# Patient Record
Sex: MALE | Race: WHITE | Employment: UNEMPLOYED | URBAN - NONMETROPOLITAN AREA
[De-identification: names, ages, dates, MRNs, and addresses within clinical notes are randomized per-mention and may not be internally consistent; named-entity substitution may affect disease eponyms.]

---

## 2022-09-10 ENCOUNTER — APPOINTMENT (OUTPATIENT)
Dept: GENERAL RADIOLOGY | Age: 22
End: 2022-09-10
Payer: COMMERCIAL

## 2022-09-10 ENCOUNTER — HOSPITAL ENCOUNTER (EMERGENCY)
Age: 22
Discharge: HOME | End: 2022-09-10
Payer: COMMERCIAL

## 2022-09-10 VITALS
OXYGEN SATURATION: 96 % | DIASTOLIC BLOOD PRESSURE: 61 MMHG | TEMPERATURE: 98.1 F | WEIGHT: 170 LBS | SYSTOLIC BLOOD PRESSURE: 146 MMHG | RESPIRATION RATE: 19 BRPM | BODY MASS INDEX: 25.18 KG/M2 | HEIGHT: 69 IN | HEART RATE: 97 BPM

## 2022-09-10 DIAGNOSIS — R07.89 ATYPICAL CHEST PAIN: ICD-10-CM

## 2022-09-10 DIAGNOSIS — R20.2 PARESTHESIA: Primary | ICD-10-CM

## 2022-09-10 LAB
ALBUMIN SERPL-MCNC: 4.6 G/DL (ref 3.5–5.1)
ALP BLD-CCNC: 60 U/L (ref 38–126)
ALT SERPL-CCNC: < 5 U/L (ref 11–66)
ANION GAP SERPL CALCULATED.3IONS-SCNC: 10 MEQ/L (ref 8–16)
AST SERPL-CCNC: < 5 U/L (ref 5–40)
BASOPHILS # BLD: 0.7 %
BASOPHILS ABSOLUTE: 0.1 THOU/MM3 (ref 0–0.1)
BILIRUB SERPL-MCNC: 0.2 MG/DL (ref 0.3–1.2)
BILIRUBIN DIRECT: < 0.2 MG/DL (ref 0–0.3)
BUN BLDV-MCNC: 16 MG/DL (ref 7–22)
CALCIUM SERPL-MCNC: 9.4 MG/DL (ref 8.5–10.5)
CHLORIDE BLD-SCNC: 103 MEQ/L (ref 98–111)
CO2: 26 MEQ/L (ref 23–33)
CREAT SERPL-MCNC: 1.1 MG/DL (ref 0.4–1.2)
EKG ATRIAL RATE: 108 BPM
EKG P AXIS: 69 DEGREES
EKG P-R INTERVAL: 162 MS
EKG Q-T INTERVAL: 316 MS
EKG QRS DURATION: 82 MS
EKG QTC CALCULATION (BAZETT): 423 MS
EKG R AXIS: 96 DEGREES
EKG T AXIS: 39 DEGREES
EKG VENTRICULAR RATE: 108 BPM
EOSINOPHIL # BLD: 1.6 %
EOSINOPHILS ABSOLUTE: 0.1 THOU/MM3 (ref 0–0.4)
ERYTHROCYTE [DISTWIDTH] IN BLOOD BY AUTOMATED COUNT: 12.4 % (ref 11.5–14.5)
ERYTHROCYTE [DISTWIDTH] IN BLOOD BY AUTOMATED COUNT: 39.2 FL (ref 35–45)
GFR SERPL CREATININE-BSD FRML MDRD: 83 ML/MIN/1.73M2
GLUCOSE BLD-MCNC: 155 MG/DL (ref 70–108)
HCT VFR BLD CALC: 44 % (ref 42–52)
HEMOGLOBIN: 15.5 GM/DL (ref 14–18)
IMMATURE GRANS (ABS): 0.02 THOU/MM3 (ref 0–0.07)
IMMATURE GRANULOCYTES: 0.2 %
LYMPHOCYTES # BLD: 47.6 %
LYMPHOCYTES ABSOLUTE: 4.1 THOU/MM3 (ref 1–4.8)
MCH RBC QN AUTO: 30.6 PG (ref 26–33)
MCHC RBC AUTO-ENTMCNC: 35.2 GM/DL (ref 32.2–35.5)
MCV RBC AUTO: 87 FL (ref 80–94)
MONOCYTES # BLD: 6.2 %
MONOCYTES ABSOLUTE: 0.5 THOU/MM3 (ref 0.4–1.3)
NUCLEATED RED BLOOD CELLS: 0 /100 WBC
OSMOLALITY CALCULATION: 281.9 MOSMOL/KG (ref 275–300)
PLATELET # BLD: 217 THOU/MM3 (ref 130–400)
PMV BLD AUTO: 11.5 FL (ref 9.4–12.4)
POTASSIUM SERPL-SCNC: 4.4 MEQ/L (ref 3.5–5.2)
RBC # BLD: 5.06 MILL/MM3 (ref 4.7–6.1)
SEG NEUTROPHILS: 43.7 %
SEGMENTED NEUTROPHILS ABSOLUTE COUNT: 3.8 THOU/MM3 (ref 1.8–7.7)
SODIUM BLD-SCNC: 139 MEQ/L (ref 135–145)
TOTAL PROTEIN: 6.6 G/DL (ref 6.1–8)
TROPONIN T: < 0.01 NG/ML
WBC # BLD: 8.7 THOU/MM3 (ref 4.8–10.8)

## 2022-09-10 PROCEDURE — 71046 X-RAY EXAM CHEST 2 VIEWS: CPT

## 2022-09-10 PROCEDURE — 93010 ELECTROCARDIOGRAM REPORT: CPT | Performed by: INTERNAL MEDICINE

## 2022-09-10 PROCEDURE — 85025 COMPLETE CBC W/AUTO DIFF WBC: CPT

## 2022-09-10 PROCEDURE — 80053 COMPREHEN METABOLIC PANEL: CPT

## 2022-09-10 PROCEDURE — 99285 EMERGENCY DEPT VISIT HI MDM: CPT

## 2022-09-10 PROCEDURE — 84484 ASSAY OF TROPONIN QUANT: CPT

## 2022-09-10 PROCEDURE — 82248 BILIRUBIN DIRECT: CPT

## 2022-09-10 PROCEDURE — 93005 ELECTROCARDIOGRAM TRACING: CPT | Performed by: PHYSICIAN ASSISTANT

## 2022-09-10 ASSESSMENT — ENCOUNTER SYMPTOMS
VOMITING: 0
SHORTNESS OF BREATH: 0
DIARRHEA: 0
SORE THROAT: 0
ABDOMINAL PAIN: 0
COUGH: 0
RHINORRHEA: 0
PHOTOPHOBIA: 0
BACK PAIN: 0
NAUSEA: 1
TROUBLE SWALLOWING: 0

## 2022-09-10 ASSESSMENT — PAIN - FUNCTIONAL ASSESSMENT: PAIN_FUNCTIONAL_ASSESSMENT: NONE - DENIES PAIN

## 2022-09-10 NOTE — ED PROVIDER NOTES
[facility] EMERGENCY DEPT      CHIEF COMPLAINT       Chief Complaint   Patient presents with    Chest Pain    Numbness       Nurses Notes reviewed and I agree except as noted in the HPI. HISTORY OF PRESENT ILLNESS    Yulissa Ramirez is a 24 y.o. male who presents for evaluation. Patient reports for the past 1 week he has been experiencing intermittent \"pinching pain\" in his arms and legs. Now for the past 2 to 3 days he has been experiencing momentary pain in his central chest.  The symptoms have no modifying factors. Tonight the left side of the patient's face went numb for 1 second prompting him to come to the ER for evaluation. He feels the symptoms are odd and he wanted to make sure that nothing bad was going on. Additionally patient had googled his symptoms which made him nervous for blood clots and stroke. Patient experienced nausea around noon but attributes that to eating fast food lately. Patient endorses normal vision but denies dyspnea, diaphoresis, facial weakness, extremity weakness, URI symptoms, dizziness, or other complaints. There have been no new medications. Patient has no past medical history. Patient does not smoke, drink alcohol, or use drugs. Family history is positive for paternal NIDDM. REVIEW OF SYSTEMS     Review of Systems   Constitutional:  Negative for chills, diaphoresis and fever. HENT:  Negative for congestion, rhinorrhea, sore throat and trouble swallowing. Eyes:  Negative for photophobia and visual disturbance. Respiratory:  Negative for cough and shortness of breath. Cardiovascular:  Positive for chest pain. Gastrointestinal:  Positive for nausea. Negative for abdominal pain, diarrhea and vomiting. Genitourinary:  Negative for difficulty urinating. Musculoskeletal:  Positive for myalgias. Negative for back pain, gait problem and neck pain. Skin:  Negative for rash. Neurological:  Positive for numbness.  Negative for dizziness, facial asymmetry, speech difficulty, weakness, light-headedness and headaches. Psychiatric/Behavioral:  Negative for confusion. The patient is nervous/anxious. PAST MEDICAL HISTORY    has no past medical history on file. SURGICAL HISTORY      has no past surgical history on file. CURRENT MEDICATIONS       Previous Medications    No medications on file       ALLERGIES     has No Known Allergies. FAMILY HISTORY     He indicated that his mother is alive. He indicated that his father is alive. family history is not on file. SOCIAL HISTORY    reports that he does not currently use alcohol. He reports that he does not use drugs. PHYSICAL EXAM     INITIAL VITALS:  height is 5' 9\" (1.753 m) and weight is 170 lb (77.1 kg). His oral temperature is 98.1 °F (36.7 °C). His blood pressure is 146/61 (abnormal) and his pulse is 97. His respiration is 19 and oxygen saturation is 96%. Physical Exam  Constitutional:       Appearance: Normal appearance. He is well-developed. He is not ill-appearing or diaphoretic. HENT:      Head: Normocephalic and atraumatic. Right Ear: Hearing normal.      Left Ear: Hearing normal.      Nose: Nose normal. No rhinorrhea. Mouth/Throat:      Lips: Pink. Mouth: Mucous membranes are moist.      Pharynx: Oropharynx is clear. Eyes:      General: Lids are normal. No scleral icterus. Extraocular Movements: Extraocular movements intact. Conjunctiva/sclera: Conjunctivae normal.      Pupils: Pupils are equal, round, and reactive to light. Neck:      Trachea: Trachea normal.   Cardiovascular:      Rate and Rhythm: Normal rate and regular rhythm. Heart sounds: Normal heart sounds. No murmur heard. Pulmonary:      Effort: Pulmonary effort is normal.      Breath sounds: Normal breath sounds and air entry. No decreased breath sounds, wheezing or rhonchi. Abdominal:      General: There is no distension. Palpations: Abdomen is soft. Tenderness:  There is no abdominal tenderness. Musculoskeletal:      Cervical back: Normal range of motion and neck supple. No rigidity. Comments: Well perfused; movement normal as observed; no signs of DVT   Lymphadenopathy:      Cervical: No cervical adenopathy. Skin:     General: Skin is warm and dry. Findings: No rash. Neurological:      General: No focal deficit present. Mental Status: He is alert. Cranial Nerves: Cranial nerves are intact. Sensory: Sensation is intact. Motor: Motor function is intact. Gait: Gait is intact. Psychiatric:         Mood and Affect: Mood is anxious. Speech: Speech normal.         Behavior: Behavior is cooperative. DIFFERENTIAL DIAGNOSIS:   Including but not limited to: Paresthesia, anxiety, atypical chest pain, myalgias, considered but no evidence for DVT, ACS, TIA, CVA, PE    DIAGNOSTIC RESULTS     EKG: All EKG's are [facility] who either signs or Co-signs this chart in the absence of a cardiologist.  Ventricular rate 108 bpm  MN interval 162 ms  QRS duration 82 ms  QTc interval 423 ms  P-R-T axes 69, 96, 39  Sinus tachycardia with PVCs. No STEMI    RADIOLOGY: non-plain film images(s) such as CT,Ultrasound and MRI are read by the radiologist.  Plain radiographic images are visualized and preliminarily interpreted by the emergency physician unless otherwise stated below. XR CHEST (2 VW)   Final Result   Impression:   No acute findings. This document has been electronically signed by: Garett Miller. Nina Garza MD    on 09/10/2022 04:52 AM          LABS:   Labs Reviewed   BASIC METABOLIC PANEL - Abnormal; Notable for the following components:       Result Value    Glucose 155 (*)     All other components within normal limits   HEPATIC FUNCTION PANEL - Abnormal; Notable for the following components:     Total Bilirubin 0.2 (*)     ALT <5 (*)     All other components within normal limits   GLOMERULAR FILTRATION RATE, ESTIMATED - Abnormal; Notable for the following components:    Est, Glom Filt Rate 83 (*)     All other components within normal limits   CBC WITH AUTO DIFFERENTIAL   TROPONIN   ANION GAP   OSMOLALITY       EMERGENCY DEPARTMENT COURSE:   Vitals:    Vitals:    09/10/22 0346 09/10/22 0456 09/10/22 0537   BP: (!) 158/92 (!) 146/61    Pulse: (!) 110 100 97   Resp: 19     Temp: 98.1 °F (36.7 °C)     TempSrc: Oral     SpO2: 98% 96%    Weight: 170 lb (77.1 kg)     Height: 5' 9\" (1.753 m)             MDM:  The patient was seen and evaluated within the ED today for facial paresthesia. On exam, I appreciated no acute findings. The patient was anxious but neurologically intact with no signs of CVA. Old records were reviewed. Within the department, I observed the patient's vital signs to be within acceptable range although mildly tachycardic and hypertensive. Radiological studies within the department revealed no acute intra cardiopulmonary process. Laboratory work was reassuring. Within the department the patient declined pain medication. I observed the patient's condition to modestly improve during the duration of their stay. On re-exam patient had no new complaints. Vital signs remained stable. The patient remained non-toxic appearing with no distress evident in the ER. No sinister etiology was found for patient's symptoms and he was deemed appropriate for discharge. The patient was comfortable with the plan of discharge home and to follow up with [facility]. Anticipatory guidance was given. The patient was instructed to return to the emergency department for any worsening of their symptoms. Patient was discharged from the emergency department in good condition with all questions answered. See disposition below.  I have given the patient strict written and verbal instructions about care at home, follow-up, and signs and symptoms of worsening of condition and they did verbalize understanding. CRITICAL CARE:   None    CONSULTS:  None    PROCEDURES:  None    FINAL IMPRESSION      1. Paresthesia    2. Atypical chest pain          DISPOSITION/PLAN     1. Paresthesia    2. Atypical chest pain        PATIENT REFERRED TO:  [address] [facility] [address] [place]. [address] [facility] [address]  Schedule an appointment as soon as possible for a visit   Please arrive 15 minutes early for paperwork.     DISCHARGE MEDICATIONS:  New Prescriptions    No medications on file       (Please note that portions of this note were completed with a voice recognition program.  Efforts were made to edit the dictations but occasionally words are mis-transcribed.)    Ata Hadley PA-C 09/10/22 5:44 AM    JEANETTE Villela PA-C  09/10/22 [id number]

## 2022-09-16 ENCOUNTER — HOSPITAL ENCOUNTER (EMERGENCY)
Age: 22
Discharge: HOME | End: 2022-09-16
Attending: EMERGENCY MEDICINE
Payer: COMMERCIAL

## 2022-09-16 VITALS
OXYGEN SATURATION: 96 % | WEIGHT: 175 LBS | SYSTOLIC BLOOD PRESSURE: 146 MMHG | DIASTOLIC BLOOD PRESSURE: 82 MMHG | BODY MASS INDEX: 25.92 KG/M2 | TEMPERATURE: 98.1 F | HEART RATE: 101 BPM | HEIGHT: 69 IN | RESPIRATION RATE: 14 BRPM

## 2022-09-16 DIAGNOSIS — R20.2 PARESTHESIA: Primary | ICD-10-CM

## 2022-09-16 PROCEDURE — 99282 EMERGENCY DEPT VISIT SF MDM: CPT

## 2022-09-16 ASSESSMENT — PAIN - FUNCTIONAL ASSESSMENT
PAIN_FUNCTIONAL_ASSESSMENT: NONE - DENIES PAIN
PAIN_FUNCTIONAL_ASSESSMENT: NONE - DENIES PAIN

## 2022-09-16 NOTE — DISCHARGE INSTRUCTIONS
See neurology referral information. Return to emergency department for any new pain, shortness of breath or any new symptoms or concerns.

## 2022-09-16 NOTE — ED PROVIDER NOTES
[address] [facility] [address] [address] EMERGENCY DEPT  [address] [facility] [address]  Phone: [phone] [address] [facility]       Chief Complaint   Patient presents with    Numbness     Left leg       Nurses Notes reviewed and I agree except as noted in the HPI. HISTORY OF PRESENT ILLNESS    Ashley Lee is a 24 y.o. male. The patient is describing intermittent paresthesias. They are not localized to 1 side or the other. Sometimes it is the right side of the face and neck and sometimes into the left. Sometimes it is in his leg. At the time of arrival he is not having any of the paresthesias. It seems to wax and wane and come and go. He has not had any trouble with ambulation. No trouble with swallowing or speaking. No trouble with vision. No recent falls or injuries. The patient was seen in the ER for similar symptoms within the last week or so had a negative work-up at the time. REVIEW OF SYSTEMS         No chest pain no dyspnea no muscle weakness in the arms or legs. Remainder of review of systems is otherwise reviewed as negative. PAST MEDICAL HISTORY    has no past medical history on file. SURGICAL HISTORY      has no past surgical history on file. CURRENT MEDICATIONS     There are no discharge medications for this patient. ALLERGIES     has No Known Allergies. FAMILY HISTORY     He indicated that his mother is alive. He indicated that his father is alive. family history is not on file. SOCIAL HISTORY      reports that he has never smoked. He has never used smokeless tobacco. He reports that he does not currently use alcohol. He reports that he does not use drugs. PHYSICAL EXAM     INITIAL VITALS:  height is 5' 9\" (1.753 m) and weight is 175 lb (79.4 kg). His oral temperature is 98.1 °F (36.7 °C). His blood pressure is 146/82 (abnormal) and his pulse is 101 (abnormal). His respiration is 14 and oxygen saturation is 96%.       Constitutional: Well appearing and non-toxic Eyes:  Pupils are equal and reactive, extraocular muscles intact   HENT:  Atraumatic appearing  oropharynx moist, no pharyngeal exudates. Neck- normal range of motion, no tenderness, supple   Respiratory:  No wheezing, rhonchi or rales  Cardiovascular: regular  GI:  Non tender, no rigidity, rebound or guarding  Musculoskeletal:  2/4 distal pulses, no pitting edema  Integument: warm and dry  Neurologic:  Alert & oriented x 3, cranial nerves II through XII are grossly intact. Equal strength in the upper and lower extremities bilaterally. Equal reflexes in the lower extremities bilaterally  Psychiatric:  Speech and behavior appropriate        DIAGNOSTIC RESULTS               LABS:   Labs Reviewed - No data to display    EMERGENCY DEPARTMENT COURSE:   Vitals:    Vitals:    09/16/22 0218 09/16/22 0328   BP: (!) 148/89 (!) 146/82   Pulse: (!) 121 (!) 101   Resp: 16 14   Temp: 98.1 °F (36.7 °C)    TempSrc: Oral    SpO2: 97% 96%   Weight: 175 lb (79.4 kg)    Height: 5' 9\" (1.753 m)      This patient has a normal neurological exam at the time of my exam.  He was initially noted to be tachycardic but it is come down. It is felt that there might be a component of anxiety in this. The paresthesias come and go in various parts of the body it is on the alternating between 1 side and the other. Its not a focal neurologic distribution that would correspond with a stroke or TIA. I do not think CT imaging would be of benefit. He had blood work on the previous visit which essentially came back negative and I do not think could be a benefit to repeating it today. Working to refer him to neurology on an outpatient basis. CRITICAL CARE:   none         FINAL IMPRESSION      Paresthesias    DISPOSITION/PLAN   discharged    DISCHARGE MEDICATIONS:  There are no discharge medications for this patient.       (Please note that portions of this note were completed with a voice recognition program.  Efforts were made to edit the

## 2022-09-16 NOTE — ED TRIAGE NOTES
Pt comes to ED through triage with c/c leg numbness. Pt states that around 2130 he noticed slight numbness in his neck. At approximately 2330 pt reports feeling numbness in his left lower leg. Pt reports that he was recently seen here for facial numbness. Pt denies the presence of pain. Pt denies any current numbness but states that it \"comes and goes. \" Bilateral pedal pulses noted equal in strength.

## 2022-09-26 ENCOUNTER — TELEPHONE (OUTPATIENT)
Dept: CARDIOLOGY CLINIC | Age: 22
End: 2022-09-26

## 2022-09-26 ENCOUNTER — INITIAL CONSULT (OUTPATIENT)
Dept: NEUROLOGY | Age: 22
End: 2022-09-26
Payer: COMMERCIAL

## 2022-09-26 ENCOUNTER — HOSPITAL ENCOUNTER (OUTPATIENT)
Age: 22
Discharge: HOME | End: 2022-09-26
Payer: COMMERCIAL

## 2022-09-26 VITALS
BODY MASS INDEX: 25.48 KG/M2 | HEIGHT: 69 IN | DIASTOLIC BLOOD PRESSURE: 98 MMHG | OXYGEN SATURATION: 99 % | HEART RATE: 102 BPM | SYSTOLIC BLOOD PRESSURE: 132 MMHG | WEIGHT: 172 LBS

## 2022-09-26 DIAGNOSIS — I49.9 IRREGULAR HEART RATE: ICD-10-CM

## 2022-09-26 DIAGNOSIS — R20.0 NUMBNESS OF FACE: ICD-10-CM

## 2022-09-26 DIAGNOSIS — R20.0 NUMBNESS: ICD-10-CM

## 2022-09-26 DIAGNOSIS — R20.0 NUMBNESS OF FACE: Primary | ICD-10-CM

## 2022-09-26 LAB
C-REACTIVE PROTEIN: < 0.3 MG/DL (ref 0–1)
EKG ATRIAL RATE: 112 BPM
EKG P AXIS: 70 DEGREES
EKG P-R INTERVAL: 176 MS
EKG Q-T INTERVAL: 316 MS
EKG QRS DURATION: 76 MS
EKG QTC CALCULATION (BAZETT): 431 MS
EKG R AXIS: 98 DEGREES
EKG T AXIS: 26 DEGREES
EKG VENTRICULAR RATE: 112 BPM
FOLATE: 16.9 NG/ML (ref 4.8–24.2)
RHEUMATOID FACTOR: < 10 IU/ML (ref 0–13)
SEDIMENTATION RATE, ERYTHROCYTE: 1 MM/HR (ref 0–10)
TSH SERPL DL<=0.05 MIU/L-ACNC: 2.37 UIU/ML (ref 0.4–4.2)
VITAMIN B-12: 1039 PG/ML (ref 211–911)

## 2022-09-26 PROCEDURE — 86430 RHEUMATOID FACTOR TEST QUAL: CPT

## 2022-09-26 PROCEDURE — 82525 ASSAY OF COPPER: CPT

## 2022-09-26 PROCEDURE — 93010 ELECTROCARDIOGRAM REPORT: CPT | Performed by: NUCLEAR MEDICINE

## 2022-09-26 PROCEDURE — 82607 VITAMIN B-12: CPT

## 2022-09-26 PROCEDURE — 99205 OFFICE O/P NEW HI 60 MIN: CPT | Performed by: PSYCHIATRY & NEUROLOGY

## 2022-09-26 PROCEDURE — 84443 ASSAY THYROID STIM HORMONE: CPT

## 2022-09-26 PROCEDURE — 86235 NUCLEAR ANTIGEN ANTIBODY: CPT

## 2022-09-26 PROCEDURE — 84207 ASSAY OF VITAMIN B-6: CPT

## 2022-09-26 PROCEDURE — 93005 ELECTROCARDIOGRAM TRACING: CPT | Performed by: NURSE PRACTITIONER

## 2022-09-26 PROCEDURE — G8419 CALC BMI OUT NRM PARAM NOF/U: HCPCS | Performed by: PSYCHIATRY & NEUROLOGY

## 2022-09-26 PROCEDURE — 85651 RBC SED RATE NONAUTOMATED: CPT

## 2022-09-26 PROCEDURE — 86038 ANTINUCLEAR ANTIBODIES: CPT

## 2022-09-26 PROCEDURE — G8427 DOCREV CUR MEDS BY ELIG CLIN: HCPCS | Performed by: PSYCHIATRY & NEUROLOGY

## 2022-09-26 PROCEDURE — 1036F TOBACCO NON-USER: CPT | Performed by: PSYCHIATRY & NEUROLOGY

## 2022-09-26 PROCEDURE — 82746 ASSAY OF FOLIC ACID SERUM: CPT

## 2022-09-26 PROCEDURE — 86140 C-REACTIVE PROTEIN: CPT

## 2022-09-26 PROCEDURE — 86225 DNA ANTIBODY NATIVE: CPT

## 2022-09-26 NOTE — PATIENT INSTRUCTIONS
MRI brain W/WO contrast  Will order connective tissue screen including BERNARDO, rheumatoid factor, antidouble-stranded DNA, anti-SSA, anti-SSB  and sed rate. Vitamin B12, folate  Vitamin B6  Copper level  TSH with reflex T4  Referral to cardiology re: irregular heart rhythm   Cardiac echo   EKG  48 hour holter  Hold off on exercise. Call with any new symptoms or concerns. Follow up in 1 month.

## 2022-09-26 NOTE — LETTER
[address] [facility]  [facility] [address] [facility] [address]  Phone: [phone]  Fax: [phone]           Tamar Conti MD      September 28, 2022     Patient: Doris Santana   MR Number: [id number]   YOB: 2000   Date of Visit: 9/26/2022       Dear Dr Nikole Louise    Thank you for agreeing to see Jenny Fonseca  evaluation/treatment. Below are the relevant portions of my assessment and plan of care. If you have questions, please do not hesitate to call me. I look forward to following Lane Chu along with you.     Sincerely,        Tamar Conti MD    CC providers:  Femi Gomes MD  [address] [place] [address]  [facility]

## 2022-09-26 NOTE — TELEPHONE ENCOUNTER
LM for patient to call office back.     Received new patient referral in Epic for   - Numbness of face   R20.0 (ICD-10-CM) - Numbness   I49.9 (ICD-10-CM) - Irregular heart rate

## 2022-09-28 NOTE — PROGRESS NOTES
Chief Complaint   Patient presents with    Consultation     numbness         Caroline Clancy is a 24 y.o. male who presents today for evaluation of 2 episodes of left face numbness that occurred 2 weeks ago. He also reports occasional patchy numbness in his hands and legs. No change in vision. No incontinence of bowel or bladder. No tick bites. No cold or flu like illness prior to onset of symptoms. No family history of connective tissue diseases. No head imaging done. He denies chest pain. No shortness of breath, no neck pain. No vision changes. No dysphagia. No fever. No rash. No weight loss. History provided by patient. History reviewed. No pertinent past medical history. There is no problem list on file for this patient. No Known Allergies    No current outpatient medications on file. No current facility-administered medications for this visit. Social History     Socioeconomic History    Marital status: Single     Spouse name: None    Number of children: None    Years of education: None    Highest education level: None   Tobacco Use    Smoking status: Never    Smokeless tobacco: Never   Vaping Use    Vaping Use: Never used   Substance and Sexual Activity    Alcohol use: Not Currently    Drug use: Never    Sexual activity: Not Currently       Family History   Problem Relation Age of Onset    No Known Problems Mother     Diabetes Father     No Known Problems Sister     No Known Problems Brother     No Known Problems Brother     No Known Problems Brother          I reviewed the past medical history, allergies, medications, social history and family history.    Review of Systems   All systems reviewed, and are all negative, except what is mentioned in HPI      Vitals:    09/26/22 1500   BP: (!) 132/98   Site: Right Upper Arm   Position: Sitting   Cuff Size: Medium Adult   Pulse: (!) 102   SpO2: 99%   Weight: 172 lb (78 kg)   Height: 5' 9\" (1.753 m)       Physical Examination:  General appearance - alert, well appearing, and in no distress, oriented to person, place, and time and normal weight  Mental status- Level of Alertness: awake  Orientation: person, place, time  Memory: normal  Fund of Knowledge: normal  Attention/Concentration: normal  Language: normal. Mood is anxious  Neck - supple, no significant adenopathy, carotids upstroke normal bilaterally. There is no axillary lymphadenopathy. There is no carotid bruit. No neck lymphadenopathy. No thyroid enlargement   Neurological -   Cranial [id number]:.   Cranial nerve II: Normal   Cranial nerve III: Pupils: equal, round, reactive to light  Cranial nerves III, IV, VI: Extraocular Movements: intact   Cranial nerve V: Facial sensation: intact   Cranial nerve VII:Facial strength: intact   Cranial nerve VIII: Hearing: intact   Cranial nerve IX: Palate Elevation intact bilaterally  Cranial nerve XI: Shoulder shrug intact bilaterally  Cranial nerve XII: Tongue midline   neck supple without rigidity, there is no limitation of range of motion of the neck. DTR's are Intact distal and symmetric  Babinski sign negative  Motor exam is 5/5 in the upper and lower extremities. Normal muscle tone. There is no muscle atrophy. Sensory is intact for light touch. Coordination: finger to nose, intact  Gait and station intact  Abnormal movement none, vibration normal, proprioception normal  Skin - warm, dry to touch, normal coloration, no rashes, no suspicious skin lesions  Superficial temporal artery pulses are normal.   There is no limitation of range of motion of the neck. There is no resting tremor, no pin rolling, no bradykinesia, no Hypohonia, normal blink rate. Musculoskeletal: Has no hand arthritis, no limitation of ROM in any of the four extremities. There is pectus excavatum noted. There is no leg edema. The Heart was irregular in rate and rhythm. +ve mitral heart murmur  Chest Clear, with  good effort. There is pectus excavatum noted. Abdomen soft, intact bowel sounds. We reviewed the patient records from referring provider and available information in the EHR       ASSESSMENT:      Diagnosis Orders   1. Numbness of face  MRI BRAIN W WO CONTRAST    BERNARDO Screen with Reflex    Anti SSA    Anti SSB    Anti-DNA Antibody, Double-Stranded    C-Reactive Protein    Rheumatoid Factor    Sedimentation Rate    Vitamin B12 & Folate    Vitamin B6    Copper, Serum    ECHO 2D WO COLOR DOPPLER COMPLETE    Holter Monitor 48 Hour    EKG 12 lead    Vineet Claire MD, Cardiology, [address] [place] [address]      2. Numbness  MRI BRAIN W WO CONTRAST    BERNARDO Screen with Reflex    Anti SSA    Anti SSB    Anti-DNA Antibody, Double-Stranded    C-Reactive Protein    Rheumatoid Factor    Sedimentation Rate    Vitamin B12 & Folate    Vitamin B6    Copper, Serum    ECHO 2D WO COLOR DOPPLER COMPLETE    Holter Monitor 48 Hour    EKG 12 lead    Martha Rodriguez MD, Cardiology, [address] [place] [address]      3. Irregular heart rate  BERNARDO Screen with Reflex    Anti SSA    Anti SSB    Anti-DNA Antibody, Double-Stranded    C-Reactive Protein    Rheumatoid Factor    Vitamin B12 & Folate    Vitamin B6    Copper, Serum    ECHO 2D WO COLOR DOPPLER COMPLETE    Holter Monitor 48 Hour    EKG 12 lead    Vineet Claire MD, Cardiology, [address] [place] [address]         This is a 70-year-old male who presents with 2 episodes of left face numbness that occurred 2 weeks ago. He also reports occasional patchy numbness in his hands and legs. On exam, there is no muscle weakness, no sensory level noted, no muscle atrophy or fasciculation, deep tendon reflexes are intact +2. However there is an irregular heart rate noted , the patient has pectus excavatum. He states he has not seen a doctor in a long time. The patient was counseled about his symptoms and work up recommended, from neurologic stand point and he will need referral to cardiology for work up.  We will send him for cardiac echo as he has mitral valve murmur, and arrange for an EKG, and 48 holter monitor, to expedite his cardiac disposition/work up, to be read by the cardiologist we are referring the patient to. We will also arrange for him to undergo an MRI of the brain with and without contrast to screen for organic causes of his symptoms as well as lab work checking connective tissue screening, thyroid function studies, trace mineral elements and vitamin levels. He will undergo formal evaluation with cardiology regarding his irregular heart rhythm as well as obtain cardiac echo and EKG. Asked him to hold off on exercising, workouts until work-up is completed and he is cleared. After detailed discussion with the patient we agreed on the following plan. Plan    MRI brain W/WO contrast  Will order connective tissue screen including BERNARDO, rheumatoid factor, antidouble-stranded DNA, anti-SSA, anti-SSB  and sed rate. Vitamin B12, folate  Vitamin B6  Copper level  TSH with reflex T4  Referral to cardiology re: irregular heart rhythm   Cardiac echo   EKG  48 hour holter  Hold off on exercise. Call with any new symptoms or concerns. Follow up in 1 month.      Total time 72 min      Boy Melgar MD

## 2022-09-29 ENCOUNTER — OFFICE VISIT (OUTPATIENT)
Dept: CARDIOLOGY CLINIC | Age: 22
End: 2022-09-29
Payer: COMMERCIAL

## 2022-09-29 VITALS
SYSTOLIC BLOOD PRESSURE: 158 MMHG | WEIGHT: 169 LBS | BODY MASS INDEX: 25.03 KG/M2 | HEIGHT: 69 IN | DIASTOLIC BLOOD PRESSURE: 90 MMHG | HEART RATE: 100 BPM

## 2022-09-29 DIAGNOSIS — I49.3 PVC (PREMATURE VENTRICULAR CONTRACTION): Primary | ICD-10-CM

## 2022-09-29 LAB
ANA SCREEN: NORMAL
COPPER: 86.1 UG/DL (ref 70–140)
DSDNA ANTIBODY: NORMAL

## 2022-09-29 PROCEDURE — G8420 CALC BMI NORM PARAMETERS: HCPCS | Performed by: INTERNAL MEDICINE

## 2022-09-29 PROCEDURE — 1036F TOBACCO NON-USER: CPT | Performed by: INTERNAL MEDICINE

## 2022-09-29 PROCEDURE — 99204 OFFICE O/P NEW MOD 45 MIN: CPT | Performed by: INTERNAL MEDICINE

## 2022-09-29 PROCEDURE — G8427 DOCREV CUR MEDS BY ELIG CLIN: HCPCS | Performed by: INTERNAL MEDICINE

## 2022-09-29 NOTE — PROGRESS NOTES
New patient here for check up     Pt has went to ED twice in the last 3 weeks , chest pain, arm numbness, having areas with numbness,

## 2022-09-29 NOTE — PROGRESS NOTES
[facility] [address] [place] [address]  Dept: [phone]  Dept Fax: [phone]  Loc: [phone]    Visit Date: 9/29/2022    Mr. Elena Elliott is a 24 y.o. male  who presented for:    HPI:   25 yo M is referred here from irregular heart beats by neurology. Patient was in the ER for what seems to be paresthesia. He is very nervous about possibility of CVA. Concerned about high BP. No current outpatient medications on file. Past Medical History  Reyes Brown  has no past medical history on file. Social History  Reyes Brown  reports that he has never smoked. He has never used smokeless tobacco. He reports that he does not currently use alcohol. He reports that he does not use drugs. Family History  Reyes Brown family history includes Diabetes in his father; No Known Problems in his brother, brother, brother, mother, and sister. Past Surgical History   No past surgical history on file. Subjective:     REVIEW OF SYSTEMS  Constitutional: denies sweats, chills and fever  HENT: denies  congestion, sinus pressure, sneezing and sore throat. Eyes: denies  pain, discharge, redness and itching. Respiratory: denies apnea, cough  Gastrointestinal: denies blood in stool, constipation, diarrhea   Endocrine: denies cold intolerance, heat intolerance, polydipsia. Genitourinary: denies dysuria, enuresis, flank pain and hematuria. Musculoskeletal: denies arthralgias, joint swelling and neck pain. Neurological: denies numbness and headaches. Psychiatric/Behavioral: denies agitation, confusion, decreased concentration and dysphoric mood    All others reviewed and are negative.    Objective:     BP (!) 158/90   Pulse 100   Ht 5' 9\" (1.753 m)   Wt 169 lb (76.7 kg)   BMI 24.96 kg/m²     Wt Readings from Last 3 Encounters:   09/29/22 169 lb (76.7 kg)   09/26/22 172 lb (78 kg)   09/16/22 175 lb (79.4 kg)     BP Readings from Last 3 Encounters:   09/29/22 (!) 158/90   09/26/22 (!) 132/98   09/16/22 (!) 146/82       PHYSICAL EXAM  Constitutional: Oriented to person, place, and time. Appears well-developed and well-nourished. HENT:   Head: Normocephalic and atraumatic. Eyes: EOM are normal. Pupils are equal, round, and reactive to light. Neck: Normal range of motion. Neck supple. No JVD present. Cardiovascular: Normal rate , normal heart sounds and intact distal pulses. Pulmonary/Chest: Effort normal and breath sounds normal. No respiratory distress. No wheezes. No rales. Abdominal: Soft. Bowel sounds are normal. No distension. There is no tenderness. Musculoskeletal: Normal range of motion. No edema. Neurological: Alert and oriented to person, place, and time. No cranial nerve deficit. Coordination normal.   Skin: Skin is warm and dry. Psychiatric: Normal mood and affect.        No results found for: CKTOTAL, CKMB, CKMBINDEX    Lab Results   Component Value Date/Time    WBC 8.7 09/10/2022 03:52 AM    RBC 5.06 09/10/2022 03:52 AM    HGB 15.5 09/10/2022 03:52 AM    HCT 44.0 09/10/2022 03:52 AM    MCV 87.0 09/10/2022 03:52 AM    MCH 30.6 09/10/2022 03:52 AM    MCHC 35.2 09/10/2022 03:52 AM     09/10/2022 03:52 AM    MPV 11.5 09/10/2022 03:52 AM       Lab Results   Component Value Date/Time     09/10/2022 03:52 AM    K 4.4 09/10/2022 03:52 AM     09/10/2022 03:52 AM    CO2 26 09/10/2022 03:52 AM    BUN 16 09/10/2022 03:52 AM    LABALBU 4.6 09/10/2022 03:52 AM    CREATININE 1.1 09/10/2022 03:52 AM    CALCIUM 9.4 09/10/2022 03:52 AM    LABGLOM 83 09/10/2022 03:52 AM    GLUCOSE 155 09/10/2022 03:52 AM       Lab Results   Component Value Date/Time    ALKPHOS 60 09/10/2022 03:52 AM    ALT <5 09/10/2022 03:52 AM    AST <5 09/10/2022 03:52 AM    PROT 6.6 09/10/2022 03:52 AM    BILITOT 0.2 09/10/2022 03:52 AM    BILIDIR <0.2 09/10/2022 03:52 AM    LABALBU 4.6 09/10/2022 03:52 AM       No results found for: MG    No results found for: INR, PROTIME      No results found for: LABA1C    No results found for: TRIG, HDL, LDLCALC, LDLDIRECT, LABVLDL    Lab Results   Component Value Date/Time    TSH 2.370 09/26/2022 04:37 PM         Testing Reviewed:      I haveindividually reviewed the below cardiac tests    EKG:    ECHO: No results found for this or any previous visit. STRESS:    CATH:    Assessment/Plan       Diagnosis Orders   1. PVC (premature ventricular contraction)          PVC  Sinus tachycardia  Paresthesia  Recent COVID    REviewed EKGs  Scheduled for Holter and echo  Will try to move it up sooner  Follow up after testing  May need beta blocker  The patient is asked to make an attempt to improve diet and exercise patterns to aid in medical management of this problem. Advised more plant based nutrition/meditarrean diet   Advised patient to call office or seek immediate medical attention if there is any new onset of  any chest pain, sob, palpitations, lightheadedness, dizziness, orthopnea, PND or pedal edema. All medication side effects were discussed in details. Thank youfor allowing me to participate in the care of this patient. Please do not hesitate to contact me for any further questions. Return in about 4 weeks (around 10/27/2022), or if symptoms worsen or fail to improve, for Regular follow up, Review testing.        Electronically signed by Lilyan Felty, MD [facility]  9/29/2022 at 3:06 PM EDT

## 2022-09-30 ENCOUNTER — HOSPITAL ENCOUNTER (OUTPATIENT)
Dept: NON INVASIVE DIAGNOSTICS | Age: 22
Discharge: HOME | End: 2022-09-30
Payer: COMMERCIAL

## 2022-09-30 DIAGNOSIS — R20.0 NUMBNESS OF FACE: ICD-10-CM

## 2022-09-30 DIAGNOSIS — R20.0 NUMBNESS: ICD-10-CM

## 2022-09-30 DIAGNOSIS — I49.9 IRREGULAR HEART RATE: ICD-10-CM

## 2022-09-30 LAB
ANTI SSA: NORMAL
ANTI SSB: 0 AU/ML (ref 0–40)
LV EF: 58 %
LVEF MODALITY: NORMAL

## 2022-09-30 PROCEDURE — 93226 XTRNL ECG REC<48 HR SCAN A/R: CPT

## 2022-09-30 PROCEDURE — 93306 TTE W/DOPPLER COMPLETE: CPT

## 2022-09-30 PROCEDURE — 93225 XTRNL ECG REC<48 HRS REC: CPT

## 2022-09-30 NOTE — PROCEDURES
48 hour Holter Monitor was applied to patient.  Patient was instructed to remove monitor on 10/2 at 241 and return to  of hospital. The serial number on the Holter Monitor is [id number]

## 2022-10-02 LAB — VITAMIN B6: 83.6 NMOL/L (ref 20–125)

## 2022-10-07 LAB
ACQUISITION DURATION: NORMAL S
AVERAGE HEART RATE: 81 BPM
HOOKUP DATE: NORMAL
HOOKUP TIME: NORMAL
MAX HEART RATE TIME/DATE: NORMAL
MAX HEART RATE: 142 BPM
MIN HEART RATE TIME/DATE: NORMAL
MIN HEART RATE: 55 BPM
NUMBER OF QRS COMPLEXES: NORMAL
NUMBER OF SUPRAVENTRICULAR COUPLETS: 0
NUMBER OF SUPRAVENTRICULAR ECTOPICS: 0
NUMBER OF SUPRAVENTRICULAR ISOLATED BEATS: 0
NUMBER OF VENTRICULAR BIGEMINAL CYCLES: 737
NUMBER OF VENTRICULAR COUPLETS: 5
NUMBER OF VENTRICULAR ECTOPICS: NORMAL

## 2022-10-10 ENCOUNTER — TELEPHONE (OUTPATIENT)
Dept: NEUROLOGY | Age: 22
End: 2022-10-10

## 2022-10-10 NOTE — TELEPHONE ENCOUNTER
----- Message from EMILIA Rose CNP sent at 10/10/2022  7:54 AM EDT -----  Please let patient know his 48 hour holter monitor showed Frequent PVCs of about 23%. Would like for him to follow up with his cardiologist regarding these findings.   Richard Centeno CNP

## 2022-10-10 NOTE — TELEPHONE ENCOUNTER
Patient is following with Dr Jayleen Vo, next visit on 10/13/22. Left voice message for patient to return call to office.

## 2022-10-13 ENCOUNTER — OFFICE VISIT (OUTPATIENT)
Dept: CARDIOLOGY CLINIC | Age: 22
End: 2022-10-13
Payer: COMMERCIAL

## 2022-10-13 VITALS
WEIGHT: 167.13 LBS | HEART RATE: 96 BPM | BODY MASS INDEX: 24.75 KG/M2 | DIASTOLIC BLOOD PRESSURE: 85 MMHG | HEIGHT: 69 IN | SYSTOLIC BLOOD PRESSURE: 141 MMHG

## 2022-10-13 DIAGNOSIS — I49.3 PVC (PREMATURE VENTRICULAR CONTRACTION): Primary | ICD-10-CM

## 2022-10-13 PROCEDURE — 99214 OFFICE O/P EST MOD 30 MIN: CPT | Performed by: INTERNAL MEDICINE

## 2022-10-13 PROCEDURE — G8484 FLU IMMUNIZE NO ADMIN: HCPCS | Performed by: INTERNAL MEDICINE

## 2022-10-13 PROCEDURE — G8427 DOCREV CUR MEDS BY ELIG CLIN: HCPCS | Performed by: INTERNAL MEDICINE

## 2022-10-13 PROCEDURE — G8420 CALC BMI NORM PARAMETERS: HCPCS | Performed by: INTERNAL MEDICINE

## 2022-10-13 PROCEDURE — 1036F TOBACCO NON-USER: CPT | Performed by: INTERNAL MEDICINE

## 2022-10-13 NOTE — PROGRESS NOTES
[facility] [address] [place] [address]  Dept: [phone]  Dept Fax: [phone]  Loc: [phone]    Visit Date: 10/13/2022    Mr. Raymundo Valdez is a 24 y.o. male  who presented for:    HPI:   23 yo M is referred here from irregular heart beats by neurology. Patient was in the ER for what seems to be paresthesia. He is very nervous about possibility of CVA. Concerned about high BP. He underwent Echo and holter monitor. Holter showed 23% PVC  On beta blockers        Current Outpatient Medications:     metoprolol tartrate (LOPRESSOR) 25 MG tablet, Take 1.5 tablets by mouth 2 times daily, Disp: 180 tablet, Rfl: 1    Past Medical History  Syeda Marrero  has no past medical history on file. Social History  Syeda Marrero  reports that he has never smoked. He has never used smokeless tobacco. He reports that he does not currently use alcohol. He reports that he does not use drugs. Family History  Syeda Marrero family history includes Diabetes in his father; No Known Problems in his brother, brother, brother, mother, and sister. Past Surgical History   No past surgical history on file. Subjective:     REVIEW OF SYSTEMS  Constitutional: denies sweats, chills and fever  HENT: denies  congestion, sinus pressure, sneezing and sore throat. Eyes: denies  pain, discharge, redness and itching. Respiratory: denies apnea, cough  Gastrointestinal: denies blood in stool, constipation, diarrhea   Endocrine: denies cold intolerance, heat intolerance, polydipsia. Genitourinary: denies dysuria, enuresis, flank pain and hematuria. Musculoskeletal: denies arthralgias, joint swelling and neck pain. Neurological: denies numbness and headaches. Psychiatric/Behavioral: denies agitation, confusion, decreased concentration and dysphoric mood    All others reviewed and are negative.    Objective:     BP (!) 141/85   Pulse 96   Ht 5' 9\" (1.753 m) Wt 167 lb 2 oz (75.8 kg)   BMI 24.68 kg/m²     Wt Readings from Last 3 Encounters:   10/13/22 167 lb 2 oz (75.8 kg)   09/29/22 169 lb (76.7 kg)   09/26/22 172 lb (78 kg)     BP Readings from Last 3 Encounters:   10/13/22 (!) 141/85   09/29/22 (!) 158/90   09/26/22 (!) 132/98       PHYSICAL EXAM  Constitutional: Oriented to person, place, and time. Appears well-developed and well-nourished. HENT:   Head: Normocephalic and atraumatic. Eyes: EOM are normal. Pupils are equal, round, and reactive to light. Neck: Normal range of motion. Neck supple. No JVD present. Cardiovascular: Normal rate , normal heart sounds and intact distal pulses. Pulmonary/Chest: Effort normal and breath sounds normal. No respiratory distress. No wheezes. No rales. Abdominal: Soft. Bowel sounds are normal. No distension. There is no tenderness. Musculoskeletal: Normal range of motion. No edema. Neurological: Alert and oriented to person, place, and time. No cranial nerve deficit. Coordination normal.   Skin: Skin is warm and dry. Psychiatric: Normal mood and affect.        No results found for: CKTOTAL, CKMB, CKMBINDEX    Lab Results   Component Value Date/Time    WBC 8.7 09/10/2022 03:52 AM    RBC 5.06 09/10/2022 03:52 AM    HGB 15.5 09/10/2022 03:52 AM    HCT 44.0 09/10/2022 03:52 AM    MCV 87.0 09/10/2022 03:52 AM    MCH 30.6 09/10/2022 03:52 AM    MCHC 35.2 09/10/2022 03:52 AM     09/10/2022 03:52 AM    MPV 11.5 09/10/2022 03:52 AM       Lab Results   Component Value Date/Time     09/10/2022 03:52 AM    K 4.4 09/10/2022 03:52 AM     09/10/2022 03:52 AM    CO2 26 09/10/2022 03:52 AM    BUN 16 09/10/2022 03:52 AM    LABALBU 4.6 09/10/2022 03:52 AM    CREATININE 1.1 09/10/2022 03:52 AM    CALCIUM 9.4 09/10/2022 03:52 AM    LABGLOM 83 09/10/2022 03:52 AM    GLUCOSE 155 09/10/2022 03:52 AM       Lab Results   Component Value Date/Time    ALKPHOS 60 09/10/2022 03:52 AM    ALT <5 09/10/2022 03:52 AM    AST <5 09/10/2022 03:52 AM    PROT 6.6 09/10/2022 03:52 AM    BILITOT 0.2 09/10/2022 03:52 AM    BILIDIR <0.2 09/10/2022 03:52 AM    LABALBU 4.6 09/10/2022 03:52 AM       No results found for: MG    No results found for: INR, PROTIME      No results found for: LABA1C    No results found for: TRIG, HDL, LDLCALC, LDLDIRECT, LABVLDL    Lab Results   Component Value Date/Time    TSH 2.370 09/26/2022 04:37 PM         Testing Reviewed:      I haveindividually reviewed the below cardiac tests    EKG:    ECHO: No results found for this or any previous visit. STRESS:    CATH:    Assessment/Plan       Diagnosis Orders   1. PVC (premature ventricular contraction)            PVC 23%  Sinus tachycardia  Paresthesia  Recent COVID    Reviewed EKGs  Reviewed Holter and echo  Had 23% PVCs, intermittently   On beta blocker,   Will increase metoprolol to 37.5mg BID  Echo showed normal LVSF  Will refer to EP for further evaluation  The patient is asked to make an attempt to improve diet and exercise patterns to aid in medical management of this problem. Advised more plant based nutrition/meditarrean diet   Advised patient to call office or seek immediate medical attention if there is any new onset of  any chest pain, sob, palpitations, lightheadedness, dizziness, orthopnea, PND or pedal edema. All medication side effects were discussed in details. Thank youfor allowing me to participate in the care of this patient. Please do not hesitate to contact me for any further questions. Return in about 1 year (around 10/13/2023), or if symptoms worsen or fail to improve, for Review testing, Regular follow up.        Electronically signed by Dolores Davidson MD [facility]  10/13/2022 at 3:06 PM EDT

## 2022-10-13 NOTE — PROGRESS NOTES
Pt here for 4 week fu holter monitor     Pt just started taking med metoprolol regularly this week,     Pt continues with have dimas in am

## 2022-10-18 ENCOUNTER — HOSPITAL ENCOUNTER (OUTPATIENT)
Dept: MRI IMAGING | Age: 22
Discharge: HOME | End: 2022-10-18
Payer: COMMERCIAL

## 2022-10-18 DIAGNOSIS — R20.0 NUMBNESS: ICD-10-CM

## 2022-10-18 DIAGNOSIS — R20.0 NUMBNESS OF FACE: ICD-10-CM

## 2022-10-18 PROCEDURE — A9579 GAD-BASE MR CONTRAST NOS,1ML: HCPCS | Performed by: NURSE PRACTITIONER

## 2022-10-18 PROCEDURE — 6360000004 HC RX CONTRAST MEDICATION: Performed by: NURSE PRACTITIONER

## 2022-10-18 PROCEDURE — 70553 MRI BRAIN STEM W/O & W/DYE: CPT

## 2022-10-18 RX ADMIN — GADOTERIDOL 15 ML: 279.3 INJECTION, SOLUTION INTRAVENOUS at 07:35

## 2022-10-26 ENCOUNTER — OFFICE VISIT (OUTPATIENT)
Dept: CARDIOLOGY CLINIC | Age: 22
End: 2022-10-26
Payer: COMMERCIAL

## 2022-10-26 VITALS
BODY MASS INDEX: 24.73 KG/M2 | DIASTOLIC BLOOD PRESSURE: 67 MMHG | SYSTOLIC BLOOD PRESSURE: 135 MMHG | OXYGEN SATURATION: 96 % | HEART RATE: 82 BPM | WEIGHT: 167 LBS | HEIGHT: 69 IN

## 2022-10-26 DIAGNOSIS — I49.3 PVC (PREMATURE VENTRICULAR CONTRACTION): Primary | ICD-10-CM

## 2022-10-26 PROCEDURE — 1036F TOBACCO NON-USER: CPT | Performed by: INTERNAL MEDICINE

## 2022-10-26 PROCEDURE — 99204 OFFICE O/P NEW MOD 45 MIN: CPT | Performed by: INTERNAL MEDICINE

## 2022-10-26 PROCEDURE — 93000 ELECTROCARDIOGRAM COMPLETE: CPT | Performed by: INTERNAL MEDICINE

## 2022-10-26 PROCEDURE — G8420 CALC BMI NORM PARAMETERS: HCPCS | Performed by: INTERNAL MEDICINE

## 2022-10-26 PROCEDURE — G8484 FLU IMMUNIZE NO ADMIN: HCPCS | Performed by: INTERNAL MEDICINE

## 2022-10-26 PROCEDURE — G8427 DOCREV CUR MEDS BY ELIG CLIN: HCPCS | Performed by: INTERNAL MEDICINE

## 2022-10-26 NOTE — PROGRESS NOTES
[address] ([place]) [address] [facility]  Dept: [phone]  Cardiac Electrophysiology: Consultation Note  Patient's demographics:  Date:   10/26/2022  Patient name:              Baron Valdes  YOB: 2000  Sex:    male   MRN:   [id number]    Primary Care Physician:  None Provider    Referring Physician:  Negro Turner MD    Reason for Consultation:  High burden Premature ventricular complexes. Clinical Summary:  21/M had mild COVID-19 infection twice in April 2022 and July 2022. Since then he has been feeling intermittent episodes of numbness left side of the face and hands and nonspecific chest pain that resulted in visit to the emergency room on 2 occasions, most recent on 9/20/2022. He was evaluated neurology service. No apparent neurological issues. He was noted to have premature ventricular beats on electrocardiogram.  Seen by Dr. Rikki Cannon. Echocardiogram showed preserved left ventricular size and function and 24-hour Holter monitor was significant for high burden PVC, 23%. He was started on metoprolol. Reports palpitation after he was told that he has PVCs. Continues to have intermittent nonspecific chest pain. No shortness of breath, lower extremity swelling or syncope. No family history of arrhythmias or sudden cardiac death. Medical Hx: PVCs dx 9/2022 (burden 23%),  anxiety and hx COVID-19 infection (4/2022 and 7/2022). Review of systems:  Constitutional: Negative for chills and fever  HENT: Negative for congestion, sinus pressure, sneezing and sore throat. Eyes: Negative for pain, discharge, redness and itching. Respiratory: Negative for apnea, cough  Gastrointestinal: Negative for blood in stool, constipation, diarrhea   Endocrine: Negative for cold intolerance, heat intolerance, polydipsia. Genitourinary: Negative for dysuria, enuresis, flank pain and hematuria.    Musculoskeletal: Negative for arthralgias, joint swelling and neck pain. Neurological: Negative for numbness and headaches. Psychiatric/Behavioral: Negative for agitation, confusion, decreased concentration and dysphoric mood. Past Medical History[de-identified]  No past medical history on file. Past Surgical History:  No past surgical history on file. Family History:  Family History   Problem Relation Age of Onset    No Known Problems Mother     Diabetes Father     No Known Problems Sister     No Known Problems Brother     No Known Problems Brother     No Known Problems Brother         Social History:  Currently has no job. He is single. Denies smoking, alcohol use or use of illicit drugs. Social History     Socioeconomic History    Marital status: Single   Tobacco Use    Smoking status: Never    Smokeless tobacco: Never   Vaping Use    Vaping Use: Never used   Substance and Sexual Activity    Alcohol use: Not Currently    Drug use: Never    Sexual activity: Not Currently        Allergies:  No Known Allergies     Medications:  Current Outpatient Medications   Medication Sig Dispense Refill    metoprolol tartrate (LOPRESSOR) 25 MG tablet Take 1.5 tablets by mouth 2 times daily 180 tablet 1     No current facility-administered medications for this visit. Physical Examination:  Vitals:    10/26/22 1433   BP: 135/67   Pulse: 82   SpO2: 96%     Body mass index is 24.66 kg/m². GENERAL: Alert and oriented. No distress. EYES: No pallor or icterus. ENT: No cyanosis. No thyromegaly or cervical LAP. VESSELS: No jugular venous distension or carotid bruits. HEART: Normal S1/S2. No murmur, rub or gallop. LUNGS: Clear to auscultation. ABDOMEN: Soft and non-tender. EXTREMITIES: No lower extremity edema.    NEUROLOGICAL: Grossly normal.     Laboratory And Diagnostic Data  I have personally reviewed and interpreted the results of the following diagnostic testing    Lab Results   Component Value Date    WBC 8.7 09/10/2022    HGB 15.5 09/10/2022    HCT 44.0 09/10/2022     09/10/2022    ALT <5 (L) 09/10/2022    AST <5 09/10/2022     09/10/2022    K 4.4 09/10/2022     09/10/2022    CREATININE 1.1 09/10/2022    BUN 16 09/10/2022    CO2 26 09/10/2022    TSH 2.370 09/26/2022     Echocardiogram LVEF 55-60%, LVWT 0.8 cm, LAD/DIMAS 2.7. No significant valvular abnormalities. ECG 9/26//2022: sinus rhythm with isolated PVC (LBSA-negative concordance). Holter Monitor 10/7/2022: PVC 05604 (23%). Impression:  Hybrid burden premature ventricular complexes, diagnosed following COVID-19 infection. Normal LVEF. Intermittent BP elevations. Anxiety. Assessment And Recommendations: The patient's symptoms and potential medical complex could be related to long-haul COVID 19 infection. He does not sense PVCs much. We will increase the dose of metoprolol to 50 mg twice daily and repeat a Holter monitor in 2 months. Hopefully, PVCs will subside over time. Thank you for allowing me to participate in the care of your patient. Please call me if you have any questions. **This report has been created using voice recognition software. It may contain minor errors which are inherent in voice recognition technology. **       Allen Tobar MD, MRCP, [facility], [facility] on 10/26/2022 at 3:10 PM

## 2022-11-07 ENCOUNTER — HOSPITAL ENCOUNTER (EMERGENCY)
Age: 22
Discharge: HOME | End: 2022-11-07
Payer: COMMERCIAL

## 2022-11-07 VITALS
OXYGEN SATURATION: 97 % | SYSTOLIC BLOOD PRESSURE: 142 MMHG | BODY MASS INDEX: 25.18 KG/M2 | TEMPERATURE: 97.6 F | HEIGHT: 69 IN | DIASTOLIC BLOOD PRESSURE: 81 MMHG | HEART RATE: 94 BPM | WEIGHT: 170 LBS | RESPIRATION RATE: 16 BRPM

## 2022-11-07 DIAGNOSIS — L73.9 FOLLICULITIS: Primary | ICD-10-CM

## 2022-11-07 PROCEDURE — 99203 OFFICE O/P NEW LOW 30 MIN: CPT | Performed by: NURSE PRACTITIONER

## 2022-11-07 PROCEDURE — 99213 OFFICE O/P EST LOW 20 MIN: CPT

## 2022-11-07 RX ORDER — MUPIROCIN CALCIUM 20 MG/G
CREAM TOPICAL 2 TIMES DAILY
Qty: 15 G | Refills: 0 | Status: SHIPPED | OUTPATIENT
Start: 2022-11-07

## 2022-11-07 ASSESSMENT — ENCOUNTER SYMPTOMS
SHORTNESS OF BREATH: 0
EYES NEGATIVE: 1
ALLERGIC/IMMUNOLOGIC NEGATIVE: 1
SORE THROAT: 0
ABDOMINAL PAIN: 0

## 2022-11-07 ASSESSMENT — PAIN - FUNCTIONAL ASSESSMENT: PAIN_FUNCTIONAL_ASSESSMENT: NONE - DENIES PAIN

## 2022-11-07 NOTE — ED PROVIDER NOTES
[address]       Chief Complaint   Patient presents with    Other     Left eye area \"bump\"       Nurses Notes reviewed and I agree except as noted in the HPI. HISTORY OF PRESENT ILLNESS   Leela Jackson is a 24 y.o. male who presents to urgent care today with multiple complaints. He is very anxious. He states that he has a spot on his chest that he is worried he could have a \"folliculitis\". Also complains of some bumps by his left eye. These \"bumps\" have been present for months. They have been causing him no issue. REVIEW OF SYSTEMS     Review of Systems   Constitutional:  Negative for activity change, chills, fatigue and fever. HENT:  Negative for congestion and sore throat. Eyes: Negative. Respiratory:  Negative for shortness of breath. Cardiovascular:  Negative for chest pain. Gastrointestinal:  Negative for abdominal pain. Endocrine: Negative. Genitourinary:  Negative for dysuria. Musculoskeletal:  Negative for myalgias. Skin:  Positive for rash. Allergic/Immunologic: Negative. Neurological: Negative. Hematological: Negative. Psychiatric/Behavioral: Negative. PAST MEDICAL HISTORY   History reviewed. No pertinent past medical history. SURGICAL HISTORY     Patient  has no past surgical history on file. CURRENT MEDICATIONS       Previous Medications    METOPROLOL TARTRATE (LOPRESSOR) 25 MG TABLET    Take 2 tablets by mouth 2 times daily       ALLERGIES     Patient is has No Known Allergies. FAMILY HISTORY     Patient'sfamily history includes Diabetes in his father; No Known Problems in his brother, brother, brother, mother, and sister. SOCIAL HISTORY     Patient  reports that he has never smoked. He has never been exposed to tobacco smoke. He has never used smokeless tobacco. He reports that he does not currently use alcohol. He reports that he does not use drugs.     PHYSICAL EXAM ED TRIAGE VITALS  BP: (!) 142/81, Temp: 97.6 °F (36.4 °C), Heart Rate: 94, Resp: 16, SpO2: 97 %  Physical Exam  Constitutional:       General: He is not in acute distress. Appearance: Normal appearance. He is well-developed. He is not ill-appearing or toxic-appearing. HENT:      Head: Normocephalic and atraumatic. Right Ear: Tympanic membrane normal.      Left Ear: Tympanic membrane normal.      Nose: No congestion or rhinorrhea. Mouth/Throat:      Mouth: Mucous membranes are moist.      Pharynx: Oropharynx is clear. No pharyngeal swelling, posterior oropharyngeal erythema or uvula swelling. Tonsils: 0 on the right. 0 on the left. Eyes:      Conjunctiva/sclera: Conjunctivae normal.      Pupils: Pupils are equal, round, and reactive to light. Cardiovascular:      Rate and Rhythm: Normal rate and regular rhythm. Heart sounds: No murmur heard. Pulmonary:      Effort: Pulmonary effort is normal.      Breath sounds: Normal breath sounds. No wheezing. Abdominal:      General: Bowel sounds are normal.      Palpations: Abdomen is soft. Tenderness: There is no abdominal tenderness. Musculoskeletal:      Cervical back: Normal range of motion and neck supple. Lymphadenopathy:      Cervical: No cervical adenopathy. Skin:     General: Skin is warm and dry. Capillary Refill: Capillary refill takes less than 2 seconds. Neurological:      General: No focal deficit present. Mental Status: He is alert and oriented to person, place, and time. Psychiatric:         Mood and Affect: Mood normal.         Behavior: Behavior normal.       DIAGNOSTIC RESULTS   Labs:No results found for this visit on 11/07/22. IMAGING:  No orders to display     URGENT CARE COURSE:         Medications - No data to display  PROCEDURES:  FINALIMPRESSION      1.  Folliculitis        DISPOSITION/PLAN   DISPOSITION Decision To Discharge 11/07/2022 10:19:26 AM    Explained to patient that milia left eye area are typically self-limiting. They will resolve on their own. They may be present for months. Described Bactroban to the chest to apply to folliculitis. Follow-up with primary care provider as needed. He denies any questions. PATIENT REFERRED TO:  Md Manpreet Guerrero        DISCHARGE MEDICATIONS:  New Prescriptions    MUPIROCIN (BACTROBAN) 2 % CREAM    Apply topically 2 times daily Apply topically 3 times daily.      Current Discharge Medication List          Anahi Jones APRN - [facility]        [id number] , APRN - CNP  11/07/22 5788

## 2022-11-07 NOTE — ED TRIAGE NOTES
Pt to [facility] ambulatory with left eye area \"bump\". This started 5-6 months ago. No blurred vision present.

## 2022-11-07 NOTE — DISCHARGE INSTRUCTIONS
Use prescribed ointment to the area on your chest.  Try Eucerin cream for dry skin to arm and legs. F/U with PCP as needed.

## 2022-11-09 ENCOUNTER — HOSPITAL ENCOUNTER (OUTPATIENT)
Dept: NON INVASIVE DIAGNOSTICS | Age: 22
Discharge: HOME | End: 2022-11-09
Payer: COMMERCIAL

## 2022-11-09 DIAGNOSIS — I49.3 PVC (PREMATURE VENTRICULAR CONTRACTION): ICD-10-CM

## 2022-11-09 PROCEDURE — 93226 XTRNL ECG REC<48 HR SCAN A/R: CPT

## 2022-11-09 PROCEDURE — 93225 XTRNL ECG REC<48 HRS REC: CPT

## 2022-11-09 NOTE — PROCEDURES
The skin was prepped and a  48 hour holter monitor was applied. The patient was instructed on the documentation of symptoms and the purpose of the holter as well as the things to avoid while wearing the holter. The patient was instructed to remove and return the holter on 11/11/2022.   The serial number of the holter that was applied is [id number]

## 2022-11-11 ENCOUNTER — OFFICE VISIT (OUTPATIENT)
Dept: NEUROLOGY | Age: 22
End: 2022-11-11
Payer: COMMERCIAL

## 2022-11-11 VITALS
BODY MASS INDEX: 24.29 KG/M2 | SYSTOLIC BLOOD PRESSURE: 120 MMHG | OXYGEN SATURATION: 96 % | HEART RATE: 85 BPM | HEIGHT: 69 IN | DIASTOLIC BLOOD PRESSURE: 82 MMHG | WEIGHT: 164 LBS

## 2022-11-11 DIAGNOSIS — I49.9 IRREGULAR HEART RATE: ICD-10-CM

## 2022-11-11 DIAGNOSIS — R20.0 NUMBNESS: ICD-10-CM

## 2022-11-11 DIAGNOSIS — M54.9 MID BACK PAIN: ICD-10-CM

## 2022-11-11 DIAGNOSIS — R20.0 NUMBNESS OF FACE: Primary | ICD-10-CM

## 2022-11-11 PROCEDURE — 1036F TOBACCO NON-USER: CPT | Performed by: NURSE PRACTITIONER

## 2022-11-11 PROCEDURE — G8420 CALC BMI NORM PARAMETERS: HCPCS | Performed by: NURSE PRACTITIONER

## 2022-11-11 PROCEDURE — G8484 FLU IMMUNIZE NO ADMIN: HCPCS | Performed by: NURSE PRACTITIONER

## 2022-11-11 PROCEDURE — 99214 OFFICE O/P EST MOD 30 MIN: CPT | Performed by: NURSE PRACTITIONER

## 2022-11-11 PROCEDURE — G8427 DOCREV CUR MEDS BY ELIG CLIN: HCPCS | Performed by: NURSE PRACTITIONER

## 2022-11-11 NOTE — PATIENT INSTRUCTIONS
MRI thoracic spine W/WO contrast  Continue following with cardiology re: irregular heart rate, abnormal holter monitor. Iron studies  Please measure your blood pressure and pulse during spells. Follow up in 2 months or sooner if needed. Call if any questions or concerns.

## 2022-11-11 NOTE — PROGRESS NOTES
NEUROLOGY OUT PATIENT FOLLOW UP NOTE:  11/11/20221:07 PM    Leela Jackson is here for follow up for intermittent facial numbness. ROS:  Respiratory : no cough, no shortness of breath  Cardiac: no chest pain. No palpitations. Renal : no flank pain, no hematuria    Skin: no rash      No Known Allergies    Current Outpatient Medications:     mupirocin (BACTROBAN) 2 % cream, Apply topically 2 times daily Apply topically 3 times daily. , Disp: 15 g, Rfl: 0    metoprolol tartrate (LOPRESSOR) 25 MG tablet, Take 2 tablets by mouth 2 times daily, Disp: 180 tablet, Rfl: 1    I reviewed the past medical history, allergies, medications, social history and family history. PE:   Vitals:    11/11/22 1249   BP: 120/82   Site: Left Upper Arm   Position: Sitting   Cuff Size: Medium Adult   Pulse: 85   SpO2: 96%   Weight: 164 lb (74.4 kg)   Height: 5' 9\" (1.753 m)     General Appearance:  awake, alert, oriented, in no acute distress  Gen: NAD, Language is Intact, speech is pressured. Skin: no rash, lesion, dry  to touch. warm  Head: no rash, no icterus  Neck: There is no carotid bruits. The Neck is supple. There is no neck lymphadenopathy. Neuro: CN 2-12 grossly intact with no focal deficits. Power 5/5 Throughout symmetric, Reflexes are +2 symmetric. Long tracts are intact. Cerebellar exam is Intact. Sensory exam is intact to light touch. Gait is intact. Musculoskeletal:  Has no hand arthritis, no limitation of ROM in any of the four extremities.   Lower extremities no edema          DATA:      Results for orders placed or performed during the hospital encounter of 09/30/22   Holter Monitor 48 Hour   Result Value Ref Range    Hookup Date [id number]     Hookup Time 756333     Acquisition Duration 744333 S    Number Of QRS Complexes 591311     Number Of Ventricular Ectopics 29143     Number Of Ventricular Bigeminal Cycles 737     Number Of Ventricular Couplets 5     Number Of Superventricular Ectopics 0     Number Of Suptaventricular Isolated Beats 0     Number Of Supraventricular Couplets 0     Average Heart Rate 81 BPM    Max Heart Rate 142 BPM    Min Heart Rate 55 BPM    Max Heart Rate Time/Date [id number]     Min Heart Rate Time/Date [id number]             Results for orders placed during the hospital encounter of 10/18/22    MRI BRAIN W WO CONTRAST    Narrative  PROCEDURE: MRI BRAIN W WO CONTRAST    CLINICAL INFORMATIONNumbness of face, Numbness. Left facial numbness intermittently for one month. Assess for demyelination. COMPARISON: No prior study. TECHNIQUE: Multiplanar and multiple spin echo T1 and T2-weighted images were obtained through the brain before and after the administration of intravenous contrast. High resolution sagittal FLAIR images were also obtained. FINDINGS:        The diffusion-weighted images are normal. The brain volume is normal.There are no intra-or extra-axial collections. There is no hydrocephalus, midline shift or mass effect. On the FLAIR and T2-weighted sequences, there is normal signal intensity in the brain. On the gradient echo T2-weighted images, there is no susceptibility artifact present. There is no abnormal enhancement in the brain. The major intracranial vascular flow voids are present. The midline craniocervical junction structures are normal.  The brainstem and pituitary gland are normal.    Impression  Normal MRI of the brain. **This report has been created using voice recognition software. It may contain minor errors which are inherent in voice recognition technology. **      Final report electronically signed by Dr. Anjelica Daniel on 10/18/2022 8:43 AM    No results found for this or any previous visit. No results found for this or any previous visit. Cardiac echo done 9/30/22:  Conclusions      Summary   Left ventricular size and systolic function is normal. Ejection fraction   was estimated at 55-60%.  LV wall thickness is within normal limits. The right ventricle was not clearly visualized . Signature      ----------------------------------------------------------------   Electronically signed by Hedy Ramirez MD (Interpreting   physician) on 09/30/2022 at 03:14 PM   ----------------------------------------------------------------    Assessment:     Diagnosis Orders   1. Numbness of face        2. Numbness        3. Irregular heart rate             Follow up for left facial numbness. He has had episodes of bilateral face numbness that involves his jaw line. I shared with him that his MRI brain W/WO contrast was normal. His lab work checking thyroid function studies, connective tissue disease, inflammatory markers, and vitamin levels were within normal range. He is following with cardiology re:  irregular heart rhythm. He had 48 hour holter monitor done 9/30/22 showed Frequent PVCs of about 23%. He was started on lopressor and feels this has helped some, but can still feel palpation at times. He is undergoing repeat 48 hour holter monitor with cardiology. He is complaining of new problem of mid back tenderness associated with numbness in his bilateral legs, that again is intermittent. There is no sensory level on exam. He denies any incontinence of bowel or bladder, no saddle numbness, no difficulty maintaining erections. We will arrange for him to undergo MRI thoracic spine W/WO contrast to evaluate for organic causes of his symptoms. After detailed discussion with patient we agreed on the following plan. Plan:  MRI thoracic spine W/WO contrast  Continue following with cardiology re: irregular heart rate, abnormal holter monitor. Iron studies  Please measure your blood pressure and pulse during spells. Follow up in 2 months or sooner if needed. Call if any questions or concerns.     Total time 34 min    EMILIA Pena - JOCELYN

## 2022-11-12 ENCOUNTER — HOSPITAL ENCOUNTER (OUTPATIENT)
Age: 22
Discharge: HOME | End: 2022-11-12
Payer: COMMERCIAL

## 2022-11-12 DIAGNOSIS — I49.9 IRREGULAR HEART RATE: ICD-10-CM

## 2022-11-12 DIAGNOSIS — R20.0 NUMBNESS: ICD-10-CM

## 2022-11-12 DIAGNOSIS — R20.0 NUMBNESS OF FACE: ICD-10-CM

## 2022-11-12 LAB
FERRITIN: 196 NG/ML (ref 22–322)
IRON: 97 UG/DL (ref 65–195)
TOTAL IRON BINDING CAPACITY: 249 UG/DL (ref 171–450)

## 2022-11-12 PROCEDURE — 83550 IRON BINDING TEST: CPT

## 2022-11-12 PROCEDURE — 83540 ASSAY OF IRON: CPT

## 2022-11-12 PROCEDURE — 36415 COLL VENOUS BLD VENIPUNCTURE: CPT

## 2022-11-12 PROCEDURE — 82728 ASSAY OF FERRITIN: CPT

## 2022-11-15 ENCOUNTER — HOSPITAL ENCOUNTER (EMERGENCY)
Age: 22
Discharge: HOME | End: 2022-11-15
Payer: COMMERCIAL

## 2022-11-15 VITALS
DIASTOLIC BLOOD PRESSURE: 57 MMHG | TEMPERATURE: 98.3 F | HEART RATE: 82 BPM | RESPIRATION RATE: 20 BRPM | SYSTOLIC BLOOD PRESSURE: 119 MMHG | OXYGEN SATURATION: 99 %

## 2022-11-15 DIAGNOSIS — M54.10 RADICULOPATHY AFFECTING UPPER EXTREMITY: Primary | ICD-10-CM

## 2022-11-15 LAB
ANION GAP SERPL CALCULATED.3IONS-SCNC: 9 MEQ/L (ref 8–16)
BASOPHILS # BLD: 0.9 %
BASOPHILS ABSOLUTE: 0.1 THOU/MM3 (ref 0–0.1)
BUN BLDV-MCNC: 11 MG/DL (ref 7–22)
CALCIUM SERPL-MCNC: 9.5 MG/DL (ref 8.5–10.5)
CHLORIDE BLD-SCNC: 104 MEQ/L (ref 98–111)
CO2: 28 MEQ/L (ref 23–33)
CREAT SERPL-MCNC: 0.9 MG/DL (ref 0.4–1.2)
EKG ATRIAL RATE: 75 BPM
EKG P AXIS: 72 DEGREES
EKG P-R INTERVAL: 168 MS
EKG Q-T INTERVAL: 308 MS
EKG QRS DURATION: 84 MS
EKG QTC CALCULATION (BAZETT): 343 MS
EKG R AXIS: 96 DEGREES
EKG T AXIS: 8 DEGREES
EKG VENTRICULAR RATE: 75 BPM
EOSINOPHIL # BLD: 1.2 %
EOSINOPHILS ABSOLUTE: 0.1 THOU/MM3 (ref 0–0.4)
ERYTHROCYTE [DISTWIDTH] IN BLOOD BY AUTOMATED COUNT: 12.4 % (ref 11.5–14.5)
ERYTHROCYTE [DISTWIDTH] IN BLOOD BY AUTOMATED COUNT: 39.9 FL (ref 35–45)
GFR SERPL CREATININE-BSD FRML MDRD: > 60 ML/MIN/1.73M2
GLUCOSE BLD-MCNC: 91 MG/DL (ref 70–108)
HCT VFR BLD CALC: 47.3 % (ref 42–52)
HEMOGLOBIN: 16.5 GM/DL (ref 14–18)
IMMATURE GRANS (ABS): 0.01 THOU/MM3 (ref 0–0.07)
IMMATURE GRANULOCYTES: 0.2 %
LYMPHOCYTES # BLD: 30.8 %
LYMPHOCYTES ABSOLUTE: 1.8 THOU/MM3 (ref 1–4.8)
MCH RBC QN AUTO: 30.7 PG (ref 26–33)
MCHC RBC AUTO-ENTMCNC: 34.9 GM/DL (ref 32.2–35.5)
MCV RBC AUTO: 87.9 FL (ref 80–94)
MONOCYTES # BLD: 6.4 %
MONOCYTES ABSOLUTE: 0.4 THOU/MM3 (ref 0.4–1.3)
NUCLEATED RED BLOOD CELLS: 0 /100 WBC
OSMOLALITY CALCULATION: 280.2 MOSMOL/KG (ref 275–300)
PLATELET # BLD: 226 THOU/MM3 (ref 130–400)
PMV BLD AUTO: 11 FL (ref 9.4–12.4)
POTASSIUM SERPL-SCNC: 4.3 MEQ/L (ref 3.5–5.2)
RBC # BLD: 5.38 MILL/MM3 (ref 4.7–6.1)
SEG NEUTROPHILS: 60.5 %
SEGMENTED NEUTROPHILS ABSOLUTE COUNT: 3.5 THOU/MM3 (ref 1.8–7.7)
SODIUM BLD-SCNC: 141 MEQ/L (ref 135–145)
TROPONIN T: < 0.01 NG/ML
WBC # BLD: 5.8 THOU/MM3 (ref 4.8–10.8)

## 2022-11-15 PROCEDURE — 80048 BASIC METABOLIC PNL TOTAL CA: CPT

## 2022-11-15 PROCEDURE — 99284 EMERGENCY DEPT VISIT MOD MDM: CPT

## 2022-11-15 PROCEDURE — 93010 ELECTROCARDIOGRAM REPORT: CPT | Performed by: INTERNAL MEDICINE

## 2022-11-15 PROCEDURE — 96372 THER/PROPH/DIAG INJ SC/IM: CPT

## 2022-11-15 PROCEDURE — 6370000000 HC RX 637 (ALT 250 FOR IP): Performed by: PHYSICIAN ASSISTANT

## 2022-11-15 PROCEDURE — 84484 ASSAY OF TROPONIN QUANT: CPT

## 2022-11-15 PROCEDURE — 6360000002 HC RX W HCPCS: Performed by: PHYSICIAN ASSISTANT

## 2022-11-15 PROCEDURE — 36415 COLL VENOUS BLD VENIPUNCTURE: CPT

## 2022-11-15 PROCEDURE — 93005 ELECTROCARDIOGRAM TRACING: CPT | Performed by: EMERGENCY MEDICINE

## 2022-11-15 PROCEDURE — 85025 COMPLETE CBC W/AUTO DIFF WBC: CPT

## 2022-11-15 RX ORDER — KETOROLAC TROMETHAMINE 30 MG/ML
15 INJECTION, SOLUTION INTRAMUSCULAR; INTRAVENOUS ONCE
Status: COMPLETED | OUTPATIENT
Start: 2022-11-15 | End: 2022-11-15

## 2022-11-15 RX ORDER — CYCLOBENZAPRINE HCL 10 MG
10 TABLET ORAL 3 TIMES DAILY PRN
Qty: 15 TABLET | Refills: 0 | Status: SHIPPED | OUTPATIENT
Start: 2022-11-15 | End: 2022-11-25

## 2022-11-15 RX ORDER — LIDOCAINE 4 G/G
1 PATCH TOPICAL DAILY
Status: DISCONTINUED | OUTPATIENT
Start: 2022-11-15 | End: 2022-11-15 | Stop reason: HOSPADM

## 2022-11-15 RX ORDER — NAPROXEN 500 MG/1
500 TABLET ORAL 2 TIMES DAILY
Qty: 60 TABLET | Refills: 0 | Status: SHIPPED | OUTPATIENT
Start: 2022-11-15

## 2022-11-15 RX ORDER — LIDOCAINE 50 MG/G
1 PATCH TOPICAL DAILY
Qty: 15 PATCH | Refills: 0 | Status: SHIPPED | OUTPATIENT
Start: 2022-11-15

## 2022-11-15 RX ADMIN — KETOROLAC TROMETHAMINE 15 MG: 30 INJECTION, SOLUTION INTRAMUSCULAR at 13:31

## 2022-11-15 ASSESSMENT — ENCOUNTER SYMPTOMS
VOMITING: 0
ABDOMINAL DISTENTION: 0
BACK PAIN: 1
COUGH: 0
WHEEZING: 0
CONSTIPATION: 0
NAUSEA: 0
APNEA: 0
STRIDOR: 0
COLOR CHANGE: 0
ABDOMINAL PAIN: 0
DIARRHEA: 0
FACIAL SWELLING: 0
SHORTNESS OF BREATH: 0

## 2022-11-15 ASSESSMENT — PAIN - FUNCTIONAL ASSESSMENT: PAIN_FUNCTIONAL_ASSESSMENT: 0-10

## 2022-11-15 ASSESSMENT — PAIN SCALES - GENERAL: PAINLEVEL_OUTOF10: 3

## 2022-11-15 NOTE — ED PROVIDER NOTES
[facility]  eMERGENCY dEPARTMENT eNCOUnter          CHIEF COMPLAINT       Chief Complaint   Patient presents with    Shoulder Pain     left       Nurses Notes reviewed and I agree except as noted inthe HPI. HISTORY OF PRESENT ILLNESS    Baron Valdes is a 24 y.o. male who presents to the Emergency Department for the evaluation of L shoulder pain which he noticed this morning when he woke up. States that he was sleeping on his left shoulder. He has previously experienced symptoms of a pinched nerve in his back that radiates to the axilla without paresthesias or weakness. He described the current pain as a pinched nerve which radiates from the L axilla to his L forearm, along with an associated ache in his back. He denied any other associated symptoms. Tylenol helped to relieve his pain. Current pain is also related to previous episodes of recurrent back pain. He denies any numbness, weakness, tingling, heart palpitations, fever, nausea, or diarrhea. He's being followed closely by cardiology for his recurrent PVC's, and being followed closely by neurology for previous episodes of numbness in his face. Current medications include lopressor. States he was concerned that his pain could have been cardiac in origin so he came to the ED for evaluation. Denies any family history of early onset heart disease, DVT/PE, lower extremity pain or swelling, recent travel or immobilization. Denies substance abuse. The HPI was provided by the patient. REVIEW OF SYSTEMS     Review of Systems   Constitutional:  Negative for activity change, appetite change, chills, diaphoresis, fatigue and fever. HENT:  Negative for drooling, ear pain and facial swelling. Eyes:  Negative for visual disturbance. Respiratory:  Negative for apnea, cough, shortness of breath, wheezing and stridor. Cardiovascular:  Negative for chest pain and palpitations.    Gastrointestinal:  Negative for abdominal distention, abdominal pain, constipation, diarrhea, nausea and vomiting. Genitourinary:  Negative for flank pain and hematuria. Musculoskeletal:  Positive for back pain. Negative for gait problem, joint swelling, myalgias, neck pain and neck stiffness. Skin:  Negative for color change. Neurological:  Negative for dizziness, seizures, syncope, facial asymmetry, speech difficulty, weakness, light-headedness, numbness and headaches. Psychiatric/Behavioral:  Negative for agitation and confusion. The patient is not nervous/anxious. PAST MEDICAL HISTORY    has no past medical history on file. SURGICAL HISTORY      has no past surgical history on file. CURRENT MEDICATIONS       Discharge Medication List as of 11/15/2022  2:18 PM        CONTINUE these medications which have NOT CHANGED    Details   mupirocin (BACTROBAN) 2 % cream Apply topically 2 times daily Apply topically 3 times daily. , Topical, 2 TIMES DAILY Starting Mon 11/7/2022, Disp-15 g, R-0, Normal      metoprolol tartrate (LOPRESSOR) 25 MG tablet Take 2 tablets by mouth 2 times daily, Disp-180 tablet, R-1Normal             ALLERGIES     has No Known Allergies. FAMILY HISTORY     He indicated that his mother is alive. He indicated that his father is alive. He indicated that his sister is alive. He indicated that all of his three brothers are alive. family history includes Diabetes in his father; No Known Problems in his brother, brother, brother, mother, and sister. SOCIAL HISTORY      reports that he has never smoked. He has never been exposed to tobacco smoke. He has never used smokeless tobacco. He reports that he does not currently use alcohol. He reports that he does not use drugs. PHYSICAL EXAM     INITIAL VITALS:  temperature is 98.3 °F (36.8 °C). His blood pressure is 119/57 (abnormal) and his pulse is 82. His respiration is 20 and oxygen saturation is 99%. Physical Exam  Vitals and nursing note reviewed. Constitutional:       Appearance: Normal appearance. HENT:      Head: Normocephalic and atraumatic. Mouth/Throat:      Mouth: Mucous membranes are moist.   Eyes:      General: No scleral icterus. Cardiovascular:      Rate and Rhythm: Normal rate and regular rhythm. Pulses: Normal pulses. Radial pulses are 2+ on the left side. Heart sounds: Normal heart sounds. No murmur heard. Pulmonary:      Effort: Pulmonary effort is normal. No respiratory distress. Breath sounds: Normal breath sounds. No wheezing or rales. Abdominal:      General: Bowel sounds are normal.      Palpations: Abdomen is soft. Tenderness: There is no abdominal tenderness. There is no guarding. Musculoskeletal:         General: No swelling, tenderness, deformity or signs of injury. Normal range of motion. Cervical back: Normal range of motion. Back:    Skin:     General: Skin is warm and dry. Capillary Refill: Capillary refill takes less than 2 seconds. Coloration: Skin is not jaundiced or pale. Findings: No bruising, erythema, lesion or rash. Neurological:      General: No focal deficit present. Mental Status: He is alert and oriented to person, place, and time. GCS: GCS eye subscore is 4. GCS verbal subscore is 5. GCS motor subscore is 6. Sensory: No sensory deficit. Motor: No weakness. Coordination: Coordination normal.      Gait: Gait normal.   Psychiatric:         Mood and Affect: Mood normal.         Behavior: Behavior normal.       DIFFERENTIAL DIAGNOSIS:   Differential diagnoses are discussed    DIAGNOSTIC RESULTS     EKG: All EKG's are interpreted by the Emergency Department Physician who either signs or Co-signsthis chart in the absence of a cardiologist.    Vent. Rate: 75 bpm  PRinterval: 168 ms  QRS duration: 84 ms  QTc: 343 ms  P-R-T axes: 72, 96, 8  Sinus rhythm with sinus arrhythmia with frequent PVCs. No STEMI.   Compared to old EKG on 9-26-22      RADIOLOGY: non-plain film images(s) such as CT, Ultrasound and MRI are read by the radiologist.    No orders to display       LABS:      Labs Reviewed   BASIC METABOLIC PANEL   CBC WITH AUTO DIFFERENTIAL   TROPONIN   GLOMERULAR FILTRATION RATE, ESTIMATED   ANION GAP   OSMOLALITY       EMERGENCY DEPARTMENT COURSE:   Vitals:    Vitals:    11/15/22 1240 11/15/22 1241   BP:  (!) 119/57   Pulse:  82   Resp: 20    Temp: 98.3 °F (36.8 °C)    SpO2: 99%       1:24 PM EST: The patient was seen and evaluated. Patient arrives with reassuring vital signs, neurologically intact for complaint of some pain in the left back radiating to the left upper extremity and concern for cardiac etiology. Low suspicion for cardiac etiology my evaluation and cardiac work-up was benign. Suspect there is a radicular aspect of this and imaging was discussed with the patient who is agreeable with deferring at this time. He was treated with Toradol with improvement in his pain. Will be discharged with naproxen, Flexeril and lidocaine patch for home symptom control. Return precautions were discussed as well as outpatient follow-up and he is agreeable with the above plan, denying further needs upon discharge. CRITICAL CARE:   None    CONSULTS:  None    PROCEDURES:  None    FINAL IMPRESSION      1.  Radiculopathy affecting upper extremity          DISPOSITION/PLAN   Discharge    PATIENT REFERRED TO:  Your family doctor      As needed    [facility]  [address] [place] [address]  [phone]    If symptoms worsen    DISCHARGEMEDICATIONS:  Discharge Medication List as of 11/15/2022  2:18 PM        START taking these medications    Details   naproxen (NAPROSYN) 500 MG tablet Take 1 tablet by mouth 2 times daily Do not take with ibuprofen, advil, motrin, or aleve., Disp-60 tablet, R-0Normal      lidocaine (LIDODERM) 5 % Place 1 patch onto the skin daily 12 hours on, 12 hours off., Disp-15 patch, R-0Normal cyclobenzaprine (FLEXERIL) 10 MG tablet Take 1 tablet by mouth 3 times daily as needed for Muscle spasms . WARNING: Medication may make you drowsy/sleepy. Do not take before driving or operating heavy machinery. , Disp-15 tablet, R-0Normal             (Please note that portions of this note were completedwith a voice recognition program.  Efforts were made to edit the dictations but occasionally words are mis-transcribed.)       Sayda Chapman PA-C  11/22/22 3959

## 2022-11-15 NOTE — ED NOTES
Patient to ED for left arm pain. Patient currently being seen by cardiology for frequent PVC's. Patient concerned his left arm pain is coming from his heart.  patient denies chest pain sob diaphoresis      Ngoc Rodgers RN  11/15/22 0986

## 2022-11-22 LAB
ACQUISITION DURATION: NORMAL S
AVERAGE HEART RATE: 74 BPM
HOOKUP DATE: NORMAL
HOOKUP TIME: NORMAL
MAX HEART RATE TIME/DATE: NORMAL
MAX HEART RATE: 108 BPM
MIN HEART RATE TIME/DATE: NORMAL
MIN HEART RATE: 55 BPM
NUMBER OF QRS COMPLEXES: NORMAL
NUMBER OF SUPRAVENTRICULAR COUPLETS: 0
NUMBER OF SUPRAVENTRICULAR ECTOPICS: 0
NUMBER OF SUPRAVENTRICULAR ISOLATED BEATS: 0
NUMBER OF VENTRICULAR BIGEMINAL CYCLES: 261
NUMBER OF VENTRICULAR COUPLETS: 0
NUMBER OF VENTRICULAR ECTOPICS: NORMAL

## 2022-12-02 ENCOUNTER — HOSPITAL ENCOUNTER (OUTPATIENT)
Dept: MRI IMAGING | Age: 22
Discharge: HOME | End: 2022-12-02
Payer: COMMERCIAL

## 2022-12-02 DIAGNOSIS — M54.9 MID BACK PAIN: ICD-10-CM

## 2022-12-02 DIAGNOSIS — R20.0 NUMBNESS: ICD-10-CM

## 2022-12-02 PROCEDURE — A9579 GAD-BASE MR CONTRAST NOS,1ML: HCPCS | Performed by: NURSE PRACTITIONER

## 2022-12-02 PROCEDURE — 72157 MRI CHEST SPINE W/O & W/DYE: CPT

## 2022-12-02 PROCEDURE — 6360000004 HC RX CONTRAST MEDICATION: Performed by: NURSE PRACTITIONER

## 2022-12-02 RX ADMIN — GADOTERIDOL 15 ML: 279.3 INJECTION, SOLUTION INTRAVENOUS at 16:10

## 2022-12-05 ENCOUNTER — TELEPHONE (OUTPATIENT)
Dept: NEUROLOGY | Age: 22
End: 2022-12-05

## 2022-12-05 NOTE — TELEPHONE ENCOUNTER
----- Message from EMILIA Mercado - CNP sent at 12/4/2022  9:17 AM EST -----  Please let patient know his MRI thoracic spine was normal  Quynh Nino CNP

## 2022-12-07 ENCOUNTER — OFFICE VISIT (OUTPATIENT)
Dept: CARDIOLOGY CLINIC | Age: 22
End: 2022-12-07
Payer: COMMERCIAL

## 2022-12-07 VITALS
SYSTOLIC BLOOD PRESSURE: 126 MMHG | DIASTOLIC BLOOD PRESSURE: 82 MMHG | HEART RATE: 81 BPM | HEIGHT: 69 IN | OXYGEN SATURATION: 98 % | BODY MASS INDEX: 24.37 KG/M2

## 2022-12-07 DIAGNOSIS — I49.3 PVC (PREMATURE VENTRICULAR CONTRACTION): Primary | ICD-10-CM

## 2022-12-07 PROCEDURE — G8420 CALC BMI NORM PARAMETERS: HCPCS | Performed by: INTERNAL MEDICINE

## 2022-12-07 PROCEDURE — G8427 DOCREV CUR MEDS BY ELIG CLIN: HCPCS | Performed by: INTERNAL MEDICINE

## 2022-12-07 PROCEDURE — 99214 OFFICE O/P EST MOD 30 MIN: CPT | Performed by: INTERNAL MEDICINE

## 2022-12-07 PROCEDURE — G8484 FLU IMMUNIZE NO ADMIN: HCPCS | Performed by: INTERNAL MEDICINE

## 2022-12-07 PROCEDURE — 1036F TOBACCO NON-USER: CPT | Performed by: INTERNAL MEDICINE

## 2022-12-07 PROCEDURE — 93000 ELECTROCARDIOGRAM COMPLETE: CPT | Performed by: INTERNAL MEDICINE

## 2022-12-07 RX ORDER — FLECAINIDE ACETATE 50 MG/1
50 TABLET ORAL 2 TIMES DAILY
Qty: 60 TABLET | Refills: 1 | Status: SHIPPED | OUTPATIENT
Start: 2022-12-07

## 2022-12-07 RX ORDER — FLECAINIDE ACETATE 50 MG/1
50 TABLET ORAL 2 TIMES DAILY
Qty: 60 TABLET | Refills: 1 | Status: CANCELLED | OUTPATIENT
Start: 2022-12-07

## 2022-12-07 NOTE — PROGRESS NOTES
[address] ()  [address] [address]  Dept: [phone]  Cardiac Electrophysiology: Follow up Note  Patient's demographics:  Date:   12/7/2022  Patient name:              Nisha Wilkes  YOB: 2000  Sex:    male   MRN:   [id number]    Primary Care Physician:  No primary care provider on file. Cardiologist:  Tess Murcia MD    Reason for Follow up:  High burden Premature ventricular complexes. Clinical Summary:  21/M was seen in the EP clinic in October 2022 for high burden symptomatic premature ventricular complexes following COVID-19 infection. Preserved left ventricular size and function. He was started on metoprolol. He had a follow-up Holter monitor on 11/9/2022 that showed increasing PVC burden, 28%. Here for follow-up visit. Reports more palpitation. He is getting more and more anxious about his extra beats. Denies chest pain, shortness of breath, syncope. He has noted some pimples over his skin which resolved following application of the ablation. He is going to see his primary care physician for further evaluation. Unfortunately, his 41 Holter monitor showed worsening PVC burden. Medical Hx: Idiopathic PVCs dx 9/2022 (burden 23% => 28%),  anxiety and hx COVID-19 infection (4/2022 and 7/2022). Review of systems:  Constitutional: Negative for chills and fever  HENT: Negative for congestion, sinus pressure, sneezing and sore throat. Eyes: Negative for pain, discharge, redness and itching. Respiratory: Negative for apnea, cough  Gastrointestinal: Negative for blood in stool, constipation, diarrhea   Endocrine: Negative for cold intolerance, heat intolerance, polydipsia. Genitourinary: Negative for dysuria, enuresis, flank pain and hematuria. Musculoskeletal: Negative for arthralgias, joint swelling and neck pain. Neurological: Negative for numbness and headaches.    Psychiatric/Behavioral: Negative for agitation, confusion, decreased concentration and dysphoric mood. Past Medical History[de-identified]  No past medical history on file. Past Surgical History:  No past surgical history on file. Family History:  Family History   Problem Relation Age of Onset    No Known Problems Mother     Diabetes Father     No Known Problems Sister     No Known Problems Brother     No Known Problems Brother     No Known Problems Brother         Social History:  Currently has no job. He is single. Denies smoking, alcohol use or use of illicit drugs. Social History     Socioeconomic History    Marital status: Single   Tobacco Use    Smoking status: Never     Passive exposure: Never    Smokeless tobacco: Never   Vaping Use    Vaping Use: Never used   Substance and Sexual Activity    Alcohol use: Not Currently    Drug use: Never    Sexual activity: Not Currently        Allergies:  No Known Allergies     Medications:  Current Outpatient Medications   Medication Sig Dispense Refill    naproxen (NAPROSYN) 500 MG tablet Take 1 tablet by mouth 2 times daily Do not take with ibuprofen, advil, motrin, or aleve. 60 tablet 0    lidocaine (LIDODERM) 5 % Place 1 patch onto the skin daily 12 hours on, 12 hours off. 15 patch 0    mupirocin (BACTROBAN) 2 % cream Apply topically 2 times daily Apply topically 3 times daily. 15 g 0    metoprolol tartrate (LOPRESSOR) 25 MG tablet Take 2 tablets by mouth 2 times daily 180 tablet 1     No current facility-administered medications for this visit. Physical Examination:  Vitals:    12/07/22 1320   BP: 126/82   Pulse: 81   SpO2: 98%   Body mass index is 24.37 kg/m². GENERAL: Alert and oriented. No distress. EYES: No pallor or icterus. ENT: No cyanosis. No thyromegaly or cervical LAP. VESSELS: No jugular venous distension or carotid bruits. HEART: Normal S1/S2. No murmur, rub or gallop. LUNGS: Clear to auscultation. ABDOMEN: Soft and non-tender. EXTREMITIES: No lower extremity edema. NEUROLOGICAL: Grossly normal.     Laboratory And Diagnostic Data  I have personally reviewed and interpreted the results of the following diagnostic testing    Lab Results   Component Value Date    WBC 5.8 11/15/2022    HGB 16.5 11/15/2022    HCT 47.3 11/15/2022     11/15/2022    ALT <5 (L) 09/10/2022    AST <5 09/10/2022     11/15/2022    K 4.3 11/15/2022     11/15/2022    CREATININE 0.9 11/15/2022    BUN 11 11/15/2022    CO2 28 11/15/2022    TSH 2.370 09/26/2022     Echocardiogram LVEF 55-60%, LVWT 0.8 cm, LAD/DIMAS 2.7. No significant valvular abnormalities. ECG 9/26//2022: sinus rhythm with isolated PVC (LBSA-negative concordance). Holter Monitor 10/7/2022: PVC 10097 (23%). Holter 11/0/22 PVC 25748 (28%). Impression:  High burden premature ventricular complexes following COVID-19 infection. Normal LVEF. Intermittent BP elevations. Anxiety. Assessment And Recommendations: The patient is having more of palpitations since his last visit. His PVC burden has increased from 23% to 28%. He reports to be compliant with metoprolol. I think he will be benefited by antiarrhythmic therapy. We will start him on flecainide 50 mg twice daily with a follow-up in 6 to 8 weeks with a Holter monitor. The side effects and the benefits of the medication were discussed. Questions answered. Thank you for allowing me to participate in the care of your patient. Please call me if you have any questions. **This report has been created using voice recognition software. It may contain minor errors which are inherent in voice recognition technology. **     Katelin Tinoco MD, MRCP, [facility], [facility] on 12/7/2022 at 1:50 PM

## 2023-01-04 ENCOUNTER — OFFICE VISIT (OUTPATIENT)
Dept: NEUROLOGY | Age: 23
End: 2023-01-04

## 2023-01-04 VITALS
BODY MASS INDEX: 25.48 KG/M2 | HEIGHT: 69 IN | OXYGEN SATURATION: 98 % | DIASTOLIC BLOOD PRESSURE: 62 MMHG | WEIGHT: 172 LBS | HEART RATE: 89 BPM | SYSTOLIC BLOOD PRESSURE: 124 MMHG

## 2023-01-04 DIAGNOSIS — R20.0 NUMBNESS: ICD-10-CM

## 2023-01-04 DIAGNOSIS — R20.0 NUMBNESS OF FACE: Primary | ICD-10-CM

## 2023-01-04 DIAGNOSIS — I49.9 IRREGULAR HEART RATE: ICD-10-CM

## 2023-01-04 PROCEDURE — 99213 OFFICE O/P EST LOW 20 MIN: CPT | Performed by: NURSE PRACTITIONER

## 2023-01-04 NOTE — PROGRESS NOTES
NEUROLOGY OUT PATIENT FOLLOW UP NOTE:  1/4/202312:59 PM    Cathi Law is here for follow up for  intermittent facial numbness. .        ROS:  Respiratory : no cough, no shortness of breath  Cardiac: no chest pain. No palpitations. Renal : no flank pain, no hematuria    Skin: no rash      No Known Allergies    Current Outpatient Medications:     flecainide (TAMBOCOR) 50 MG tablet, Take 1 tablet by mouth 2 times daily, Disp: 60 tablet, Rfl: 1    metoprolol tartrate (LOPRESSOR) 25 MG tablet, Take 2 tablets by mouth 2 times daily, Disp: 180 tablet, Rfl: 1    naproxen (NAPROSYN) 500 MG tablet, Take 1 tablet by mouth 2 times daily Do not take with ibuprofen, advil, motrin, or aleve. (Patient not taking: Reported on 1/4/2023), Disp: 60 tablet, Rfl: 0    lidocaine (LIDODERM) 5 %, Place 1 patch onto the skin daily 12 hours on, 12 hours off. (Patient not taking: Reported on 1/4/2023), Disp: 15 patch, Rfl: 0    mupirocin (BACTROBAN) 2 % cream, Apply topically 2 times daily Apply topically 3 times daily. (Patient not taking: Reported on 1/4/2023), Disp: 15 g, Rfl: 0    I reviewed the past medical history, allergies, medications, social history and family history. PE:   Vitals:    01/04/23 1253   BP: 124/62   Site: Right Upper Arm   Position: Sitting   Cuff Size: Medium Adult   Pulse: 89   SpO2: 98%   Weight: 172 lb (78 kg)   Height: 5' 9\" (1.753 m)     General Appearance:  awake, alert, oriented, in no acute distress  Gen: NAD, Language is Intact, speech is pressured. Skin: no rash, lesion, dry  to touch. warm  Head: no rash, no icterus  Neck: There is no carotid bruits. The Neck is supple. There is no neck lymphadenopathy. Neuro: CN 2-12 grossly intact with no focal deficits. Power 5/5 Throughout symmetric, Reflexes are +2 symmetric. Long tracts are intact. Cerebellar exam is Intact. Sensory exam is intact to light touch. Gait is intact.   Musculoskeletal:  Has no hand arthritis, no limitation of ROM in any of the four extremities.   Lower extremities no edema          DATA:         Results for orders placed or performed during the hospital encounter of 35/11/98   Basic Metabolic Panel   Result Value Ref Range    Sodium 141 135 - 145 meq/L    Potassium 4.3 3.5 - 5.2 meq/L    Chloride 104 98 - 111 meq/L    CO2 28 23 - 33 meq/L    Glucose 91 70 - 108 mg/dL    BUN 11 7 - 22 mg/dL    Creatinine 0.9 0.4 - 1.2 mg/dL    Calcium 9.5 8.5 - 10.5 mg/dL   CBC with Auto Differential   Result Value Ref Range    WBC 5.8 4.8 - 10.8 thou/mm3    RBC 5.38 4.70 - 6.10 mill/mm3    Hemoglobin 16.5 14.0 - 18.0 gm/dl    Hematocrit 47.3 42.0 - 52.0 %    MCV 87.9 80.0 - 94.0 fL    MCH 30.7 26.0 - 33.0 pg    MCHC 34.9 32.2 - 35.5 gm/dl    RDW-CV 12.4 11.5 - 14.5 %    RDW-SD 39.9 35.0 - 45.0 fL    Platelets 848 487 - 397 thou/mm3    MPV 11.0 9.4 - 12.4 fL    Seg Neutrophils 60.5 %    Lymphocytes 30.8 %    Monocytes 6.4 %    Eosinophils 1.2 %    Basophils 0.9 %    Immature Granulocytes 0.2 %    Segs Absolute 3.5 1.8 - 7.7 thou/mm3    Lymphocytes Absolute 1.8 1.0 - 4.8 thou/mm3    Monocytes Absolute 0.4 0.4 - 1.3 thou/mm3    Eosinophils Absolute 0.1 0.0 - 0.4 thou/mm3    Basophils Absolute 0.1 0.0 - 0.1 thou/mm3    Immature Grans (Abs) 0.01 0.00 - 0.07 thou/mm3    nRBC 0 /100 wbc   Troponin   Result Value Ref Range    Troponin T < 0.010 ng/ml   Glomerular Filtration Rate, Estimated   Result Value Ref Range    Est, Glom Filt Rate >60 >60 ml/min/1.73m2   Anion Gap   Result Value Ref Range    Anion Gap 9.0 8.0 - 16.0 meq/L   Osmolality   Result Value Ref Range    Osmolality Calc 280.2 275.0 - 300.0 mOsmol/kg   EKG Irregular Heart Beat   Result Value Ref Range    Ventricular Rate 75 BPM    Atrial Rate 75 BPM    P-R Interval 168 ms    QRS Duration 84 ms    Q-T Interval 308 ms    QTc Calculation (Bazett) 343 ms    P Axis 72 degrees    R Axis 96 degrees    T Axis 8 degrees             Results for orders placed during the hospital encounter of 10/18/22    MRI BRAIN W WO CONTRAST    Narrative  PROCEDURE: MRI BRAIN W WO CONTRAST    CLINICAL INFORMATIONNumbness of face, Numbness. Left facial numbness intermittently for one month. Assess for demyelination. COMPARISON: No prior study. TECHNIQUE: Multiplanar and multiple spin echo T1 and T2-weighted images were obtained through the brain before and after the administration of intravenous contrast. High resolution sagittal FLAIR images were also obtained. FINDINGS:        The diffusion-weighted images are normal. The brain volume is normal.There are no intra-or extra-axial collections. There is no hydrocephalus, midline shift or mass effect. On the FLAIR and T2-weighted sequences, there is normal signal intensity in the brain. On the gradient echo T2-weighted images, there is no susceptibility artifact present. There is no abnormal enhancement in the brain. The major intracranial vascular flow voids are present. The midline craniocervical junction structures are normal.  The brainstem and pituitary gland are normal.    Impression  Normal MRI of the brain. **This report has been created using voice recognition software. It may contain minor errors which are inherent in voice recognition technology. **      Final report electronically signed by Dr. Jessica Linares on 10/18/2022 8:43 AM             Assessment:     Diagnosis Orders   1. Numbness of face        2. Numbness        3. Irregular heart rate             Follow up for left facial numbness. He is doing some better. He denies any more episodes of face numbness. I shared with him that his MRI brain W/WO contrast was normal. His back tenderness has improved. I shared with him that his MRI thoracic spine W/WO contrast showed no concerning findings. His lab work done checking iron levels were within acceptable range. He is following with cardiology re:  irregular heart rhythm.  He underwent repeat holter monitor that showed  his PVC burden has increased from 23% to 28%. He was on  metoprolol. So his cardiologist started him on flecainide. After detailed discussion with patient we agreed on the following plan. Plan:  Continue following with cardiology re: irregular heart rate, abnormal holter monitor. Follow up as needed. Call if any questions or concerns.     Total time 24 min    EMILIA Agustin - CNP

## 2023-01-04 NOTE — PATIENT INSTRUCTIONS
Continue following with cardiology re: irregular heart rate, abnormal holter monitor. Follow up as needed. Call if any questions or concerns.

## 2023-02-06 RX ORDER — FLECAINIDE ACETATE 50 MG/1
TABLET ORAL
Qty: 180 TABLET | Refills: 2 | Status: SHIPPED | OUTPATIENT
Start: 2023-02-06

## 2023-02-07 ENCOUNTER — HOSPITAL ENCOUNTER (OUTPATIENT)
Dept: NON INVASIVE DIAGNOSTICS | Age: 23
Discharge: HOME | End: 2023-02-07
Payer: COMMERCIAL

## 2023-02-07 DIAGNOSIS — I49.3 PVC (PREMATURE VENTRICULAR CONTRACTION): ICD-10-CM

## 2023-02-07 PROCEDURE — 93226 XTRNL ECG REC<48 HR SCAN A/R: CPT

## 2023-02-07 PROCEDURE — 93225 XTRNL ECG REC<48 HRS REC: CPT

## 2023-02-07 NOTE — PROCEDURES
48 hour Holter Monitor was applied to patient.  Patient was instructed to remove monitor on 2/9 at 953 and return to  of hospital. The serial number on the Holter Monitor is [id number]

## 2023-02-14 NOTE — PROGRESS NOTES
[address] (EP)  [address] [address]  Dept: [phone]  Cardiac Electrophysiology: Follow up Note  Patient's demographics:  Date:   2/15/2023  Patient name:              Terri Sanchez  YOB: 2000  Sex:    male   MRN:   [id number]    Primary Care Physician:  No primary care provider on file. Cardiologist:  Jeffy Prado MD    Reason for Follow up:  Premature ventricular complexes. Clinical Summary:  21/M was seen in the EP clinic in October 2022 for high burden symptomatic premature ventricular complexes following COVID-19 infection. Preserved left ventricular size and function. He was started on metoprolol and subsequently on Flecainide. Follow-up Holter monitor on 2/7/2023 showed a PVC burden, 27%. Continues to have palpitation. No chest pain, shortness of breath, syncope or lower extremity swelling. Compliant with medications. Medical Hx: Idiopathic PVCs dx 9/2022 (burden 23% => 28% => 27%),  anxiety and hx COVID-19 infection (4/2022 and 7/2022). Review of systems:  Constitutional: Negative for chills and fever  HENT: Negative for congestion, sinus pressure, sneezing and sore throat. Eyes: Negative for pain, discharge, redness and itching. Respiratory: Negative for apnea, cough  Gastrointestinal: Negative for blood in stool, constipation, diarrhea   Endocrine: Negative for cold intolerance, heat intolerance, polydipsia. Genitourinary: Negative for dysuria, enuresis, flank pain and hematuria. Musculoskeletal: Negative for arthralgias, joint swelling and neck pain. Neurological: Negative for numbness and headaches. Psychiatric/Behavioral: Negative for agitation, confusion, decreased concentration and dysphoric mood. Past Medical History[de-identified]  No past medical history on file. Past Surgical History:  No past surgical history on file.     Family History:  Family History   Problem Relation Age of Onset No Known Problems Mother     Diabetes Father     No Known Problems Sister     No Known Problems Brother     No Known Problems Brother     No Known Problems Brother         Social History:  Currently has no job. He is single. Denies smoking, alcohol use or use of illicit drugs. Social History     Socioeconomic History    Marital status: Single   Tobacco Use    Smoking status: Never     Passive exposure: Never    Smokeless tobacco: Never   Vaping Use    Vaping Use: Never used   Substance and Sexual Activity    Alcohol use: Not Currently    Drug use: Never    Sexual activity: Not Currently        Allergies:  No Known Allergies     Medications:  Current Outpatient Medications   Medication Sig Dispense Refill    flecainide (TAMBOCOR) 50 MG tablet Take 1 tablet by mouth twice daily 180 tablet 2    naproxen (NAPROSYN) 500 MG tablet Take 1 tablet by mouth 2 times daily Do not take with ibuprofen, advil, motrin, or aleve. (Patient not taking: Reported on 1/4/2023) 60 tablet 0    lidocaine (LIDODERM) 5 % Place 1 patch onto the skin daily 12 hours on, 12 hours off. (Patient not taking: Reported on 1/4/2023) 15 patch 0    mupirocin (BACTROBAN) 2 % cream Apply topically 2 times daily Apply topically 3 times daily. (Patient not taking: Reported on 1/4/2023) 15 g 0    metoprolol tartrate (LOPRESSOR) 25 MG tablet Take 2 tablets by mouth 2 times daily 180 tablet 1     No current facility-administered medications for this visit. Physical Examination:  Vitals:    02/15/23 1316   BP: 111/66   Pulse: 76   SpO2: 96%     Body mass index is 25.13 kg/m². Telemetry I stopped her metoprolol she    GENERAL: Alert and oriented. No distress. EYES: No pallor or icterus. ENT: No cyanosis. No thyromegaly or cervical LAP. VESSELS: No jugular venous distension or carotid bruits. HEART: Normal S1/S2. Occasional irregularity. No murmur, rub or gallop. LUNGS: Clear to auscultation. ABDOMEN: Soft and non-tender.    EXTREMITIES: No lower extremity edema. NEUROLOGICAL: Grossly normal.     Laboratory And Diagnostic Data  I have personally reviewed and interpreted the results of the following diagnostic testing    Lab Results   Component Value Date    WBC 5.8 11/15/2022    HGB 16.5 11/15/2022    HCT 47.3 11/15/2022     11/15/2022    ALT <5 (L) 09/10/2022    AST <5 09/10/2022     11/15/2022    K 4.3 11/15/2022     11/15/2022    CREATININE 0.9 11/15/2022    BUN 11 11/15/2022    CO2 28 11/15/2022    TSH 2.370 09/26/2022     Echocardiogram LVEF 55-60%, LVWT 0.8 cm, LAD/DIMAS 2.7. No significant valvular abnormalities. ECG 9/26//2022: sinus rhythm with isolated PVC (LBSA-negative concordance). Holter Monitor 10/7/2022: PVC 11247 (23%). Holter 11/0/22 PVC 34064 (28%). Holter 2/7/2023: PVC 27%. Impression:  High burden PVCs (27%). Normal LVEF. On Flecainide. Anxiety. Assessment And Recommendations: The patient continues to have significant PVC burden despite being on combination of metoprolol and flecainide. Discussed options including escalating the doses of flecainide versus catheter ablation. The patient wants to proceed with catheter ablation which has been recommended by his cardiologist as well. He does not want to stay antiarrhythmic drugs for the rest of the life. Discussed the risk and the benefits of PVC ablation. Questions answered. Thank you for allowing me to participate in the care of your patient. Please call me if you have any questions. **This report has been created using voice recognition software. It may contain minor errors which are inherent in voice recognition technology. **     Sosa Alcazar MD, MRCP, [facility] on 2/15/2023 at 1:58 PM

## 2023-02-15 ENCOUNTER — TELEPHONE (OUTPATIENT)
Dept: CARDIOLOGY CLINIC | Age: 23
End: 2023-02-15

## 2023-02-15 ENCOUNTER — OFFICE VISIT (OUTPATIENT)
Dept: CARDIOLOGY CLINIC | Age: 23
End: 2023-02-15
Payer: COMMERCIAL

## 2023-02-15 VITALS
DIASTOLIC BLOOD PRESSURE: 66 MMHG | WEIGHT: 170.2 LBS | HEIGHT: 69 IN | BODY MASS INDEX: 25.21 KG/M2 | SYSTOLIC BLOOD PRESSURE: 111 MMHG | HEART RATE: 76 BPM | OXYGEN SATURATION: 96 %

## 2023-02-15 DIAGNOSIS — I49.3 PVC (PREMATURE VENTRICULAR CONTRACTION): Primary | ICD-10-CM

## 2023-02-15 PROCEDURE — 99214 OFFICE O/P EST MOD 30 MIN: CPT | Performed by: INTERNAL MEDICINE

## 2023-02-15 PROCEDURE — 93000 ELECTROCARDIOGRAM COMPLETE: CPT | Performed by: INTERNAL MEDICINE

## 2023-02-15 NOTE — TELEPHONE ENCOUNTER
Procedure: PVC ablation   Date: 02.27.2023  Arrival Time: 5am  Meds to Hold: Flecainide and Metoprolol 3 days prior/   Dr. Cifuentes Minors please see meds-  do you agree?         Ref # [id number]  Pending auth #  [id number]  Auth Pended thru [employer]

## 2023-02-24 ENCOUNTER — PREP FOR PROCEDURE (OUTPATIENT)
Dept: CARDIOLOGY | Age: 23
End: 2023-02-24

## 2023-02-24 RX ORDER — SODIUM CHLORIDE 0.9 % (FLUSH) 0.9 %
5-40 SYRINGE (ML) INJECTION PRN
Status: CANCELLED | OUTPATIENT
Start: 2023-02-24

## 2023-02-24 RX ORDER — SODIUM CHLORIDE 0.9 % (FLUSH) 0.9 %
5-40 SYRINGE (ML) INJECTION EVERY 12 HOURS SCHEDULED
Status: CANCELLED | OUTPATIENT
Start: 2023-02-24

## 2023-02-24 RX ORDER — SODIUM CHLORIDE 9 MG/ML
INJECTION, SOLUTION INTRAVENOUS PRN
Status: CANCELLED | OUTPATIENT
Start: 2023-02-24

## 2023-02-27 ENCOUNTER — HOSPITAL ENCOUNTER (OUTPATIENT)
Dept: INPATIENT UNIT | Age: 23
Discharge: HOME | End: 2023-02-27
Attending: INTERNAL MEDICINE | Admitting: INTERNAL MEDICINE
Payer: COMMERCIAL

## 2023-02-27 VITALS
HEIGHT: 69 IN | RESPIRATION RATE: 17 BRPM | SYSTOLIC BLOOD PRESSURE: 144 MMHG | BODY MASS INDEX: 25.18 KG/M2 | OXYGEN SATURATION: 96 % | WEIGHT: 170 LBS | DIASTOLIC BLOOD PRESSURE: 84 MMHG | TEMPERATURE: 98.9 F | HEART RATE: 100 BPM

## 2023-02-27 LAB
ABO: NORMAL
ANION GAP SERPL CALC-SCNC: 12 MEQ/L (ref 8–16)
ANTIBODY SCREEN: NORMAL
APTT PPP: 33.3 SECONDS (ref 22–38)
BUN SERPL-MCNC: 13 MG/DL (ref 7–22)
CALCIUM SERPL-MCNC: 9.8 MG/DL (ref 8.5–10.5)
CHLORIDE SERPL-SCNC: 103 MEQ/L (ref 98–111)
CO2 SERPL-SCNC: 25 MEQ/L (ref 23–33)
CREAT SERPL-MCNC: 1.1 MG/DL (ref 0.4–1.2)
DEPRECATED RDW RBC AUTO: 41 FL (ref 35–45)
ERYTHROCYTE [DISTWIDTH] IN BLOOD BY AUTOMATED COUNT: 12.4 % (ref 11.5–14.5)
GFR SERPL CREATININE-BSD FRML MDRD: > 60 ML/MIN/1.73M2
GLUCOSE SERPL-MCNC: 111 MG/DL (ref 70–108)
HCT VFR BLD AUTO: 46.3 % (ref 42–52)
HGB BLD-MCNC: 15.7 GM/DL (ref 14–18)
INR PPP: 1.13 (ref 0.85–1.13)
MCH RBC QN AUTO: 31 PG (ref 26–33)
MCHC RBC AUTO-ENTMCNC: 33.9 GM/DL (ref 32.2–35.5)
MCV RBC AUTO: 91.5 FL (ref 80–94)
PLATELET # BLD AUTO: 212 THOU/MM3 (ref 130–400)
PMV BLD AUTO: 10.8 FL (ref 9.4–12.4)
POTASSIUM SERPL-SCNC: 3.8 MEQ/L (ref 3.5–5.2)
RBC # BLD AUTO: 5.06 MILL/MM3 (ref 4.7–6.1)
RH FACTOR: NORMAL
SODIUM SERPL-SCNC: 140 MEQ/L (ref 135–145)
WBC # BLD AUTO: 7.9 THOU/MM3 (ref 4.8–10.8)

## 2023-02-27 PROCEDURE — 93654 COMPRE EP EVAL TX VT: CPT

## 2023-02-27 PROCEDURE — 93010 ELECTROCARDIOGRAM REPORT: CPT | Performed by: INTERNAL MEDICINE

## 2023-02-27 PROCEDURE — 85730 THROMBOPLASTIN TIME PARTIAL: CPT

## 2023-02-27 PROCEDURE — 2500000003 HC RX 250 WO HCPCS

## 2023-02-27 PROCEDURE — 93005 ELECTROCARDIOGRAM TRACING: CPT | Performed by: PHYSICIAN ASSISTANT

## 2023-02-27 PROCEDURE — C1731 CATH, EP, 20 OR MORE ELEC: HCPCS

## 2023-02-27 PROCEDURE — C1730 CATH, EP, 19 OR FEW ELECT: HCPCS

## 2023-02-27 PROCEDURE — 86923 COMPATIBILITY TEST ELECTRIC: CPT

## 2023-02-27 PROCEDURE — 99153 MOD SED SAME PHYS/QHP EA: CPT

## 2023-02-27 PROCEDURE — 2709999900 HC NON-CHARGEABLE SUPPLY

## 2023-02-27 PROCEDURE — 80048 BASIC METABOLIC PNL TOTAL CA: CPT

## 2023-02-27 PROCEDURE — 2580000003 HC RX 258: Performed by: PHYSICIAN ASSISTANT

## 2023-02-27 PROCEDURE — C1732 CATH, EP, DIAG/ABL, 3D/VECT: HCPCS

## 2023-02-27 PROCEDURE — 6360000002 HC RX W HCPCS

## 2023-02-27 PROCEDURE — C1766 INTRO/SHEATH,STRBLE,NON-PEEL: HCPCS

## 2023-02-27 PROCEDURE — 93654 COMPRE EP EVAL TX VT: CPT | Performed by: INTERNAL MEDICINE

## 2023-02-27 PROCEDURE — C1894 INTRO/SHEATH, NON-LASER: HCPCS

## 2023-02-27 PROCEDURE — 86850 RBC ANTIBODY SCREEN: CPT

## 2023-02-27 PROCEDURE — 36415 COLL VENOUS BLD VENIPUNCTURE: CPT

## 2023-02-27 PROCEDURE — 86900 BLOOD TYPING SEROLOGIC ABO: CPT

## 2023-02-27 PROCEDURE — 85027 COMPLETE CBC AUTOMATED: CPT

## 2023-02-27 PROCEDURE — 99152 MOD SED SAME PHYS/QHP 5/>YRS: CPT

## 2023-02-27 PROCEDURE — 93005 ELECTROCARDIOGRAM TRACING: CPT | Performed by: INTERNAL MEDICINE

## 2023-02-27 PROCEDURE — 85610 PROTHROMBIN TIME: CPT

## 2023-02-27 PROCEDURE — 86901 BLOOD TYPING SEROLOGIC RH(D): CPT

## 2023-02-27 RX ORDER — ASPIRIN 81 MG/1
81 TABLET ORAL DAILY
Qty: 30 TABLET | Refills: 0 | Status: SHIPPED | OUTPATIENT
Start: 2023-02-27 | End: 2023-03-29 | Stop reason: ALTCHOICE

## 2023-02-27 RX ORDER — SODIUM CHLORIDE 0.9 % (FLUSH) 0.9 %
5-40 SYRINGE (ML) INJECTION EVERY 12 HOURS SCHEDULED
Status: DISCONTINUED | OUTPATIENT
Start: 2023-02-27 | End: 2023-02-27 | Stop reason: HOSPADM

## 2023-02-27 RX ORDER — SODIUM CHLORIDE 0.9 % (FLUSH) 0.9 %
5-40 SYRINGE (ML) INJECTION PRN
Status: DISCONTINUED | OUTPATIENT
Start: 2023-02-27 | End: 2023-02-27 | Stop reason: HOSPADM

## 2023-02-27 RX ORDER — SODIUM CHLORIDE 9 MG/ML
INJECTION, SOLUTION INTRAVENOUS PRN
Status: DISCONTINUED | OUTPATIENT
Start: 2023-02-27 | End: 2023-02-27 | Stop reason: HOSPADM

## 2023-02-27 RX ADMIN — SODIUM CHLORIDE 75 ML/HR: 9 INJECTION, SOLUTION INTRAVENOUS at 07:00

## 2023-02-27 NOTE — BRIEF OP NOTE
Brief Postoperative Note    Date:   2/27/2023  Patient name: Yamil Colbert  YOB: 2000  Sex: male   MRN:   [id number]    PCP: No primary care provider on file. Procedure: PVC ablation. Pre-Op Diagnosis: PVC    Post-Op Diagnosis: RV pa muscle PVC. Surgeon: Toribio Guzman MD, MRCP, [place], [place]    Assistant: Timothy Hamilton. Anesthesia/sedation:  Local lidocaine/fentanyl and midazolam as needed. Estimated Blood Loss (mL): less than 50     Complications: None    Recommendations:  See orders in Epic. Bed rest for 2 hours. Watch access site/s for bleeding or swelling. Hold pressure if bleeding or swelling. Ambulate 2 hour after suture/sheath removal.  Remove Davalos's catheter (if in place) once patient ambulates. Stop IV fluids once patient starts eating. Follow up in 4 weeks in EP clinic.            Electronically signed by Km Plummer MD, Anthony Dias on 2/27/2023 at 11:01 AM

## 2023-02-27 NOTE — PROCEDURES
[facility] () [address] [address] [address]  Dept: [phone]  CARDIAC ELECTROPHYSIOLOGY: PROCEDURE NOTE  Patient Demographics:  Date:   2/27/2023  Patient name:              Elena Sanchez  YOB: 2000  Sex: male   MRN:   [id number]    Cardiac Electrophysiologist:  Jesus Solomon MD, [facility], [facility]    Primary Care Provider:  No primary care provider on file. Cardiologist:  Urvashi Quintanilla MD    Procedure Planned:  Idiopathic premature ventricular complex ablation. Preoperative Diagnosis:   Symptomatic premature ventricular complexes. Postoperative Diagnosis:  Premature ventricular complexes, originating from right ventricular posterior papillary muscle. Brief Clinical Summary:  22/M with symptomatic Flecainide refractory high burden *27%) PVCs electively presents for PVC ablation. Preserved echo (LVEF 55-60%). Medical Hx anxiety and hx of COVID-19 infection (2022). .      Procedure/s Performed:  3D electro anatomical mapping ([employer])). Mapping and ablation of premature ventricular complex originating from posterior papillary muscle of the right ventricle. Description of the Procedure: The patient was brought to the electrophysiology lab in a fasting and nonsedated status. He was prepped and draped in the usual sterile fashion. Intravenous Fentanyl and Midazolam were used as needed for analgesia and conscious sedation. Lidocaine, 2% was injected into femoral regions and right side of neck for local anesthesia. Vascular access was obtained under ultrasound guidance using 21G micropuncture needle and hemostatic short sheaths placed over the J-tip 0.035\" guidewires (9-French and 7-French sheaths right femoral vein and 7-French left femoral vein). A 3D geometry of right atrium and coronary sinus was created using using 3.5 mm open irrigated tip (STSF, DF curve) catheter.       At baseline, the patient was in sinus rhythm with a sinus cycle length 563 milliseconds, ME interval 157 milliseconds, QRSd 98 milliseconds, QT interval 390 milliseconds, AH interval 81 and HV interval of 39. Frequent clinic premature ventricular complexes (PVC) were noted at baseline, LBSLA and V4 transition). The PVC coupling interval was 550 milliseconds. The ablation catheter was advanced from the right femoral vein and sequential activation mapping of PVC was performed along RV inflow, RVOT,and GCV//AIC. The earliest bipolar local ventricular activation during PVC was noted to be along inferior tricuspid annulus. , in time with QRS onset and r wave on unipolar electrogram.  A 7 Arabic multiple circular catheter (HALO) was advanced to the right femoral vein and positioned along the tricuspid annulus. Again the earliest activation site was on electrodes pair C3-C4, resting on the inferior tricuspid annulus. Further mapping was performed at and around the site. We did not get better activation time and good pace map at this site. The 7 Arabic sheath in the right femoral vein was exchanged with a steerable 8.5 Western Claudette long sheath (physical). The ablation cath was advanced through the sheath into the right ventricle. Activation mapping distal to the tricuspid annulus at multiple sites was performed. The earliest activation site was noted to be inferiorly along the posterior papillary muscle, 18 ms ahead of the onset of the surface QRS with aQQS pattern on unipolar QRS. A pace map at the site yielded 96% correlation with the clinical PVC. Radiofrequency application at the site resulted in complete suppression of the premature ventricular complex. Multiple insurance lesions were given at the site.   Isopril, maximum 10 mcg/kg/min was started which increased the heart rate from 92 to 153 bpm.  During the washout phase of the Isuprel, isolated clinical premature ventricular complexes with subtle changes in the QRS complex (lead aVR in lead III) were noted. Further mapping was performed and the earliest activation site was noted to be a little distal and inferior to the previously ablated site, 23 ms. QRS with 98% morphology correlation. Ablation at the site instantly resulted in complete suppression of the PVCs. We started with 13 W and escalated to a maximum of 40 W for a maximum of 60 seconds for each ablation lesion. The patient monitored for 20 minutes after last ablation lesion. Occasional nonclinical PVCs were noted. The patient developed transient right bundle branch during the catheter management ablation on few occasions. Post ablation sinus cycle length was 530 ms, NH interval 158 ms, QRS duration 100 ms, QT interval 388 ms, AH interval 80 ms and HV interval of 38 ms. At the end of the procedure, the catheters and sheaths were removed. Hemostasis was achieved by manual compression and a light sterile dressing applied. Ablation Summary:  Total number of RF lesions 26. Total RF time 12.2 minutes, maximum energy used 40 alonso, mean catheter tip temperature 32 °C.     Medications:  Midazolam 3 mg intravenously. Fentanyl 175 mcg intravenously. Lidocaine 20 cc SQ for local anesthesia. Isuprel (max 10 mcg/kg) intravenously. Metoprolol 12 mg intravenously. Fluoroscopy Time:  None      Blood Loss:  20 cc. Fluid Balance:    Atrial/0 cc. Complications:  None. Summary:  Successful ablation with complete suppression of PVC originating from posterior papillary muscle of right ventricle. Transient right bundle branch block due to catheter manipulation. Recommendations:  Discontinue flecainide. Aspirin 81 mg for 1 month. Discharge and postoperative care per standard protocol. Follow up in 4 weeks.       Electronically signed by Lottie Lockett MD, Caryl Moncada on 2/27/2023 at 11:11 AM

## 2023-02-27 NOTE — PLAN OF CARE
Problem: Discharge Planning  Goal: Discharge to home or other facility with appropriate resources  2/27/2023 1038 by Nano Montes RN  Outcome: Adequate for Discharge  Flowsheets (Taken 2/27/2023 0547)  Discharge to home or other facility with appropriate resources: Identify barriers to discharge with patient and caregiver  2/27/2023 0511 by Nano Montes RN  Outcome: Progressing

## 2023-02-27 NOTE — FLOWSHEET NOTE
Patient admitted to 2E15  Ambulatory for pvc ablation. Patient NPO. Vital signs obtained. Assessment and data collection intiated. Oriented to room. Policies and procedures for 2E explained. All questions answered with no further questions at this time. Fall prevention and safety precautions discussed with patient. Explained patients right to have family, representative or physician notified of their admission. Patient has Declined for physician to be notified. Patient has Declined for family/representative to be notified.

## 2023-02-27 NOTE — DISCHARGE INSTRUCTIONS
Activity:     1. Do your normal activities except:             No heavy lifting more than 10 pounds for 3 days. No strenuous activity for 7 days. No driving for 24 hours. 2. You may shower in the morning, but no tub baths for 3 days. 3.  Ask your doctor when you can return to work. 4.  Remove the bandages from the puncture sites the next day if they have not already fallen off.     5. You may cover the site with a regular bandage for another day to keep the site clean and dry. Diet:      You may resume your previous diet. Care of Access site (groin and neck)     1. Examine the puncture site daily until it is well-healed. 2. Some bruising is expected and will slowly disappear. 3. Minor pain/soreness is also normal.       Medications: Follow the schedule of medicines given  by your doctor. Things to report to your doctor     1. Fever, swelling, bleeding. 2.  Redness at the puncture site. 3.  Warm or hot sensation at puncture sites. 4.  Purulence or drainage of fluid from the puncture site. 5.  Numbness, tingling or coldness in the affected leg. Appointments:  Call your doctor at the following number , [phone] to set up an appointment for one month after. Call your doctor immediately:      1. If you have a fever, drainage from your puncture site, persistent chest discomfort , or uncontrolled heart rate. 2.If you cannot contact your doctor, go to the nearest emergency room. If bleeding or swelling occurs to neck or groin sites, apply pressure, call 911 or the doctor and maintain pressure over site. SEDATION/ANALGESIA INFORMATION/HOME GOING ADVICE    SEDATION / ANALGESIA INFORMATION / HOME GOING ADVICE  You have received the sedation/analgesia medication during your visit     Sedation/analgesia is used during short medical procedures under controlled supervision.  The medication will produce a strong relaxation. You will be able to hear, speak and follow instructions, but your memory and alertness will be decreased. You will be able to swallow and breathe on your own. During sedation/analgesia your blood pressure, heart and breathing will be watched closely. After the procedure, you may not remember what was said or done. You may have the following effects from the medication. \"           Drowsiness, dizziness, sleepiness or confusion. \"           Difficulty remembering or delayed reaction times. \"           Loss of fine muscle control or difficulty with your balance especially while walking. \"           Difficulty focusing or blurred vision. You may not be aware of slight changes in your behavior and/or your reaction time because of the medication used during the procedure. Therefore you should follow these instructions. \"           Have someone responsible help you with your care. \"           Do not drive for 24 hours. \"           Do not operate equipment for 24 hours (lawnmowers, power tools, kitchen accessories, stove). \"           Do not drink any alcoholic beverages for a minimum of 24 hours. \"           Do not make important personal, legal or business decisions for 24 hours. \"           You may experience dizziness or lightheadedness. Move slowly and carefully, do not make sudden position changes. \"           Drink extra amounts of fluids today. \"           Increase your diet as tolerated (unless you have received specific instructions from your doctor). \"           If you feel nauseated, continue with liquids until the nausea is gone. \"           Notify your physician if you have not urinated within 8 hours after the procedure. \"           Resume your medications unless otherwise instructed. Contact your physician if you have any questions or concerns.      IF YOU REPORT TO AN EMERGENCY ROOM, DOCTOR'S OFFICE OR HOSPITAL WITHIN 24 HOURS AFTER YOUR PROCEDURE, BRING THIS SHEET AND YOUR AFTER VISIT SUMMARY WITH YOU AND GIVE IT TO THE PHYSICIAN OR NURSE ATTENDING YOU.

## 2023-02-27 NOTE — H&P
[address] [facility] ()  [address] [facility] [address]  H&P and SEDATION/ANALGESIA     Patient demographics:  Date:   2/27/2023  Patient name: Vonda Zambrano  YOB: 2000  Sex: male   MRN:   [id number]    Reason for admission or planned procedure:  Premature ventricular complex ablation. Code Status: Full Code    Consent:The risk and benefits and alternatives of PVC ablation, including bleeding, vascular injury, stroke, MI, cardiac perforation requiring emergency pericardiocentesis/sternotomy, heart failure decompensation, death, potential exposure to radiation and recurrent arrhythmia requiring medical therapy or repeat ablation . He wishes to proceed. Questions answered and He verbalized understanding of the discussion. 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 Preserved LVEF. Past Medical History:  Past Medical History:   Diagnosis Date    PVC (premature ventricular contraction)        Past Surgical History:  No past surgical history on file. Allergies: Allergies as of 02/27/2023    (No Known Allergies)     Additional information:       Medications     Current Facility-Administered Medications:     sodium chloride flush 0.9 % injection 5-40 mL, 5-40 mL, IntraVENous, 2 times per day, Erick Beckett PA-C    sodium chloride flush 0.9 % injection 5-40 mL, 5-40 mL, IntraVENous, PRN, Ely Leonard PA-C    0.9 % sodium chloride infusion, , IntraVENous, PRN, Erick Beckett PA-C  Prior to Admission medications    Medication Sig Start Date End Date Taking?  Authorizing Provider   flecainide (TAMBOCOR) 50 MG tablet Take 1 tablet by mouth twice daily 2/6/23   EMILIA Manley - CNP   metoprolol tartrate (LOPRESSOR) 25 MG tablet Take 2 tablets by mouth 2 times daily 10/26/22   Niraj Mata MD     Aspirin  [] 81 mg  [] 325 mg  [x] None  Antiplatelet drug therapy use last 5 days  [x]No []Yes  Coumadin Use Last 5 Days [x]No []Yes  Other anticoagulant use last 5 days  [x]No []Yes  Additional information: Per medical records       Vitals:   Vitals:    02/27/23 0535   BP: 134/68   Pulse: 98   Resp: 19   Temp:    SpO2: 100%       Physical Examination:   GENERAL: Alert and oriented. No distress. EYES: No pallor or icterus. ENT: No central cyanosis. VESSELS: No jugular venous distension or carotid bruits. HEART: Normal S1/S2. No murmur, rub or gallop. LUNGS: Clear to auscultation. ABDOMEN: Soft and non-tender. EXTREMITIES: No lower extremity edema. Feet are warm. NEUROLOGICAL: Grossly intact. Procedure Plannd:   [] AF ablation [] Atrial Flutter ablation [] SVT ablation [x] PVC/VT ablation [] EP study  [] Pacemaker implantation [] AICD implantation [] BiV PM/ICD implantation   [] Generator change [] Loop recorder implantation [] Loop recorder explantation. [] Micra leadless pacemaker implantation. [] His bundle/Left bundle pacemaker implantation. [] Lead revision. [] Pocket Revision. [] STANISLAV. [] Cardioversion    []Other:       Planned Sedation/Analgesia:  [] General anesthesia. [x] Midazolam [x] Fentanyl [] Propofol [] Propofol with anesthesia team.    []Midazolam []Meperidine []Sublimaze []Morphine [] Diazepam    []Other:       ASA Classification  []1 []2 [x]3 []4 []5  Class 1: A normal healthy patient  Class 2: Pt with mild to moderate systemic disease  Class 3: Severe systemic disease or disturbance  Class 4: Severe systemic disorders that are already life threatening. Class 5: Moribund pt with little chances of survival, for more than 24 hours. Mallampati Airway Classification:   []1 []2 [x]3 []4    [x]Pre-procedure diagnostic studies complete and results available. Comment:    [x]Previous sedation/anesthesia experiences assessed.    Comment:    [x]The patient is an appropriate candidate to undergo the planned procedure sedation and anesthesia. (Refer to nursing sedation/analgesia documentation record)  [x]Formulation and discussion of sedation/procedure plan, risks, and expectations with patient and/or responsible adult completed. [x]Patient examined immediately prior to the procedure.  (Refer to nursing sedation/analgesia documentation record)        Mary Kate An MD MD [facility]  Electronically signed 2/27/2023 at 7:10 AM

## 2023-02-27 NOTE — FLOWSHEET NOTE
Received from cath lab. Bilateral groin sites stable. Pt aware to keep both legs still and to not cross his legs or lift his head. 0.9 normal saline cont. Denies pain or needs. Resting with easy resp.

## 2023-02-27 NOTE — PLAN OF CARE
Problem: Discharge Planning  Goal: Discharge to home or other facility with appropriate resources  Outcome: Progressing   Pt to have pvc ablation today

## 2023-02-28 ENCOUNTER — TELEPHONE (OUTPATIENT)
Dept: CARDIOLOGY CLINIC | Age: 23
End: 2023-02-28

## 2023-02-28 LAB
EKG ATRIAL RATE: 102 BPM
EKG ATRIAL RATE: 94 BPM
EKG P AXIS: 78 DEGREES
EKG P AXIS: 79 DEGREES
EKG P-R INTERVAL: 172 MS
EKG P-R INTERVAL: 210 MS
EKG Q-T INTERVAL: 312 MS
EKG Q-T INTERVAL: 360 MS
EKG QRS DURATION: 78 MS
EKG QRS DURATION: 84 MS
EKG QTC CALCULATION (BAZETT): 390 MS
EKG QTC CALCULATION (BAZETT): 469 MS
EKG R AXIS: 106 DEGREES
EKG R AXIS: 108 DEGREES
EKG T AXIS: 43 DEGREES
EKG T AXIS: 48 DEGREES
EKG VENTRICULAR RATE: 102 BPM
EKG VENTRICULAR RATE: 94 BPM

## 2023-02-28 NOTE — TELEPHONE ENCOUNTER
POD 1 PVC ablation     States chest is sore   Right leg is sore, moreso than left   Ok for Tylenol and ice packs  Denies any drainage, denies numbness tingling   Knows to call office if he has issues

## 2023-03-06 ENCOUNTER — NURSE ONLY (OUTPATIENT)
Dept: CARDIOLOGY CLINIC | Age: 23
End: 2023-03-06

## 2023-03-06 NOTE — PROGRESS NOTES
Pt here post PVC ablation  for incision check. Bilateral groin sites free of hematoma, hard knots, redness, or drainage. No bruising, numbness or tingling noted. Aware to call the office in anything changes.

## 2023-03-17 ENCOUNTER — APPOINTMENT (OUTPATIENT)
Dept: INTERVENTIONAL RADIOLOGY/VASCULAR | Age: 23
End: 2023-03-17
Payer: COMMERCIAL

## 2023-03-17 ENCOUNTER — HOSPITAL ENCOUNTER (EMERGENCY)
Age: 23
Discharge: HOME | End: 2023-03-17
Attending: ORTHOPAEDIC SURGERY
Payer: COMMERCIAL

## 2023-03-17 VITALS
OXYGEN SATURATION: 98 % | DIASTOLIC BLOOD PRESSURE: 78 MMHG | HEIGHT: 69 IN | SYSTOLIC BLOOD PRESSURE: 148 MMHG | TEMPERATURE: 98.2 F | HEART RATE: 85 BPM | RESPIRATION RATE: 17 BRPM | WEIGHT: 165 LBS | BODY MASS INDEX: 24.44 KG/M2

## 2023-03-17 DIAGNOSIS — M54.2 NECK PAIN: Primary | ICD-10-CM

## 2023-03-17 DIAGNOSIS — S16.1XXA NECK MUSCLE STRAIN, INITIAL ENCOUNTER: ICD-10-CM

## 2023-03-17 LAB
ANION GAP SERPL CALC-SCNC: 10 MEQ/L (ref 8–16)
BUN SERPL-MCNC: 12 MG/DL (ref 7–22)
CALCIUM SERPL-MCNC: 9.6 MG/DL (ref 8.5–10.5)
CHLORIDE SERPL-SCNC: 102 MEQ/L (ref 98–111)
CO2 SERPL-SCNC: 28 MEQ/L (ref 23–33)
CREAT SERPL-MCNC: 1.1 MG/DL (ref 0.4–1.2)
D DIMER PPP IA.FEU-MCNC: 661 NG/ML FEU (ref 0–500)
GFR SERPL CREATININE-BSD FRML MDRD: > 60 ML/MIN/1.73M2
GLUCOSE SERPL-MCNC: 87 MG/DL (ref 70–108)
MAGNESIUM SERPL-MCNC: 1.9 MG/DL (ref 1.6–2.4)
OSMOLALITY SERPL CALC.SUM OF ELEC: 278.5 MOSMOL/KG (ref 275–300)
POTASSIUM SERPL-SCNC: 4.9 MEQ/L (ref 3.5–5.2)
SODIUM SERPL-SCNC: 140 MEQ/L (ref 135–145)
TSH SERPL DL<=0.005 MIU/L-ACNC: 3.61 UIU/ML (ref 0.4–4.2)

## 2023-03-17 PROCEDURE — 93970 EXTREMITY STUDY: CPT

## 2023-03-17 PROCEDURE — 83735 ASSAY OF MAGNESIUM: CPT

## 2023-03-17 PROCEDURE — 36415 COLL VENOUS BLD VENIPUNCTURE: CPT

## 2023-03-17 PROCEDURE — 99284 EMERGENCY DEPT VISIT MOD MDM: CPT

## 2023-03-17 PROCEDURE — 80048 BASIC METABOLIC PNL TOTAL CA: CPT

## 2023-03-17 PROCEDURE — 84443 ASSAY THYROID STIM HORMONE: CPT

## 2023-03-17 PROCEDURE — 93005 ELECTROCARDIOGRAM TRACING: CPT | Performed by: EMERGENCY MEDICINE

## 2023-03-17 PROCEDURE — 85379 FIBRIN DEGRADATION QUANT: CPT

## 2023-03-17 ASSESSMENT — PAIN - FUNCTIONAL ASSESSMENT
PAIN_FUNCTIONAL_ASSESSMENT: NONE - DENIES PAIN
PAIN_FUNCTIONAL_ASSESSMENT: NONE - DENIES PAIN
PAIN_FUNCTIONAL_ASSESSMENT: 0-10
PAIN_FUNCTIONAL_ASSESSMENT: NONE - DENIES PAIN

## 2023-03-17 ASSESSMENT — ENCOUNTER SYMPTOMS: RESPIRATORY NEGATIVE: 1

## 2023-03-17 ASSESSMENT — PAIN SCALES - GENERAL: PAINLEVEL_OUTOF10: 3

## 2023-03-17 NOTE — ED NOTES
Pt resting in bed. Respirations even and unlabored. Pt updated on plan of care. Bed lowest position and call light in reach.      Mariel Corona RN  03/17/23 1941

## 2023-03-17 NOTE — ED TRIAGE NOTES
Pt presents to the ED with c/o neck pain and concerned for blood clots. Pt reports he noticed two nights ago his neck felt odd. Pt had neck surgery on the 27th of Feb. Pt states they told him there could be a concern for blood clots so he wanted to come in and get checked out. Pt reports the left leg was giving him pain on the back of the leg. Pt rates pain 3/10 at this time.  Vss.

## 2023-03-17 NOTE — ED PROVIDER NOTES
[facility]  EMERGENCY DEPARTMENT ENCOUNTER          Pt Name: Hank George  MRN: [id number]  Birthdate 2000  Date of evaluation: 3/17/2023  Treating Resident Physician: Valentin Kearney DO  Supervising Physician: Alessandra Araiza MD    History obtained from the patient.    CHIEF COMPLAINT       Chief Complaint   Patient presents with    Leg Pain    Neck Pain           HISTORY OF PRESENT ILLNESS    The history is provided by the patient.   Hank George is a 22 y.o. male who presents to the emergency department for evaluation of neck pain.     Left sided eye pain on Monday.   Weird feeling in back of neck which started last night.   PVC ablation done 3 weeks ago. Scared for blood clot. But says he slept on his neck wrong and may have tweaked it.  Neck discomfort is located at base of neck around spinous process of C7 vertebrae.  Patient says the discomfort isn't really a pain, but more of a \"weird feeling\".    Wells score of 1    The patient has no other acute complaints at this time.       REVIEW OF SYSTEMS   Review of Systems   Constitutional: Negative.    HENT: Negative.     Respiratory: Negative.     Cardiovascular: Negative.    Genitourinary: Negative.    Musculoskeletal:  Positive for neck pain (Patient says it is more discomfort than pain). Negative for arthralgias, joint swelling, myalgias and neck stiffness.   Skin: Negative.    Neurological: Negative.    Hematological: Negative.    Psychiatric/Behavioral: Negative.         PAST MEDICAL AND SURGICAL HISTORY     Past Medical History:   Diagnosis Date    PVC (premature ventricular contraction)      No past surgical history on file.      MEDICATIONS   No current facility-administered medications for this encounter.    Current Outpatient Medications:     aspirin EC 81 MG EC tablet, Take 1 tablet by mouth daily, Disp: 30 tablet, Rfl: 0    metoprolol tartrate (LOPRESSOR) 25 MG tablet, Take 2 tablets by mouth 2  times daily, Disp: 180 tablet, Rfl: 1      SOCIAL HISTORY     Social History     Social History Narrative    Not on file     Social History     Tobacco Use    Smoking status: Never     Passive exposure: Never    Smokeless tobacco: Never   Vaping Use    Vaping Use: Never used   Substance Use Topics    Alcohol use: Not Currently    Drug use: Never         ALLERGIES   No Known Allergies      FAMILY HISTORY     Family History   Problem Relation Age of Onset    No Known Problems Mother     Diabetes Father     No Known Problems Sister     No Known Problems Brother     No Known Problems Brother     No Known Problems Brother          PREVIOUS RECORDS   Previous records reviewed:  Previous ED note reviewed . PHYSICAL EXAM     ED Triage Vitals [03/17/23 1742]   BP Temp Temp Source Heart Rate Resp SpO2 Height Weight   (!) 141/69 98.2 °F (36.8 °C) Oral 77 18 99 % 5' 9\" (1.753 m) 165 lb (74.8 kg)     Initial vital signs and nursing assessment reviewed and normal. Body mass index is 24.37 kg/m². Pulsoximetry is normal per my interpretation. Additional Vital Signs:  Vitals:    03/17/23 2024   BP: (!) 148/78   Pulse: 85   Resp: 17   Temp:    SpO2: 98%       Physical Exam  Constitutional:       Appearance: Normal appearance. He is normal weight. Cardiovascular:      Rate and Rhythm: Normal rate and regular rhythm. Pulses: Normal pulses. Heart sounds: Normal heart sounds. Pulmonary:      Effort: Pulmonary effort is normal.      Breath sounds: Normal breath sounds. Abdominal:      General: Abdomen is flat. Bowel sounds are normal.      Palpations: Abdomen is soft. Musculoskeletal:         General: Normal range of motion. Cervical back: Normal range of motion and neck supple. No rigidity or tenderness. Lymphadenopathy:      Cervical: No cervical adenopathy. Skin:     General: Skin is warm and dry. Capillary Refill: Capillary refill takes less than 2 seconds.    Neurological:      General: No focal deficit present. Mental Status: He is alert and oriented to person, place, and time. Mental status is at baseline. Psychiatric:         Mood and Affect: Mood normal.         Behavior: Behavior normal.         Thought Content: Thought content normal.         Judgment: Judgment normal.           MEDICAL DECISION MAKING     48-year-old male who had PVC ablation 3 weeks ago presents for mild neck discomfort and he is concerned for blood clot. Physical exam was unremarkable. No tenderness with palpation of posterior neck. Patient says that he slept wrong a few nights ago and may have \"tweaked his neck\" but wants to rule out any blood clot. D-dimer was slightly elevated. Ultrasound of bilateral internal jugular veins and subclavian veins was negative for DVT. Patient was diagnosed with muscle strain. Discharged home to self-care. ED RESULTS   Laboratory results:  Labs Reviewed   D-DIMER, QUANTITATIVE - Abnormal; Notable for the following components:       Result Value    D-Dimer, Quant 661.00 (*)     All other components within normal limits   BASIC METABOLIC PANEL   MAGNESIUM   TSH WITH REFLEX   ANION GAP   GLOMERULAR FILTRATION RATE, ESTIMATED   OSMOLALITY       Radiologic studies results:  VL DUP UPPER EXTREMITY VENOUS BILATERAL   Final Result   No evidence of a DVT in the right and left internal jugular and subclavian veins. .               **This report has been created using voice recognition software. It may contain minor errors which are inherent in voice recognition technology. **      Final report electronically signed by DR Milta Severe on 3/17/2023 8:45 PM          ED Medications administered this visit: Medications - No data to display      ED COURSE     ED Course as of 03/17/23 2128   Fri Mar 17, 2023   2127 TSH: 3.610 [KR]   2127 D-Dimer, Quant(!): 661.00 [KR]   2127 Creatinine: 1.1 [KR]   2127 Sodium: 140 [KR]   2127 Potassium: 4.9 [KR]   2128 EKG Irregular Heart Beat [KR]   2128 VL DUP UPPER EXTREMITY VENOUS BILATERAL [KR]      ED Course User Index  [KR] Jack Boyd DO        Strict return precautions and follow up instructions were discussed with the patient prior to discharge, with which the patient agrees. MEDICATION CHANGES     Discharge Medication List as of 3/17/2023  9:21 PM            FINAL DISPOSITION      Final diagnoses:   Neck pain   Neck muscle strain, initial encounter     Condition: condition: good  Dispo: Discharge to home      This transcription was electronically signed. Parts of this transcriptions may have been dictated by use of voice recognition software and electronically transcribed, and parts may have been transcribed with the assistance of an ED scribe. The transcription may contain errors not detected in proofreading. Please refer to my supervising physician's documentation if my documentation differs.     Electronically Signed: Jack Boyd DO, 03/17/23, 9:28 PM         Jack Boyd DO  Resident  03/17/23 5497

## 2023-03-17 NOTE — ED PROVIDER NOTES
[facility] EMERGENCY DEPT  Emergency Department  Attending Physician Attestation       Patient was seen by  ER/IM Resident Dr. Valentin Kearney and I supervised/co-managed the case    I performed a history and physical examination of the patient and discussed management with the Resident/Trainee. I reviewed the Resident's note and agree with the documented findings and plan of care. Any areas of disagreement are noted on the chart. I was personally present for the key portions of any procedures. I have documented in the chart those procedures where I was not present during the key portions. I have reviewed the emergency nurses triage note. I agree with the chief complaint, past medical history, past surgical history, allergies, medications, social and family history as documented unless otherwise noted below. For Physician Assistant/ Nurse Practitioner cases I have personally evaluated this patient and have completed at least one if not all key elements of the E/M (history, physical exam, and MDM). Additional findings are as noted.       Chief Complaint      Hank George is a 22 y.o. male who presented to the emergency room due to concern of blood clot.  Patient had an ablation by Dr. Aguilera 2/27/2023 due to frequent PVCs.  Patient states that 2 days ago started to have neck pain and he is thinks that he slept wrong and Monday to Wednesday this week having a headache.  The headache had improved.  Denies any fever chills no shortness of breath or chest pain no nausea no vomiting no palpitation.  She is taking aspirin EC daily.  Patient is concerned as he might develop blood clots, as a precaution the patient came here to be evaluated      System Problem List     There is no problem list on file for this patient.      Pertinent Physical Exam:    INITIAL VITALS: BP (!) 141/69   Pulse 77   Temp 98.2 °F (36.8 °C) (Oral)   Resp 18   Ht 5' 9\" (1.753 m)   Wt 165 lb (74.8 kg)   SpO2 99%   BMI 24.37 kg/m²   Estimated body mass index is 24.37 kg/m² as calculated from the following:    Height as of this encounter: 5' 9\" (1.753 m). Weight as of this encounter: 165 lb (74.8 kg). Physical Exam  Vitals reviewed. Constitutional:       Appearance: He is well-developed. HENT:      Head: Normocephalic and atraumatic. Right Ear: External ear normal.      Left Ear: External ear normal.      Nose: Nose normal.   Eyes:      General: No scleral icterus. Conjunctiva/sclera: Conjunctivae normal.      Pupils: Pupils are equal, round, and reactive to light. Neck:      Thyroid: No thyromegaly. Vascular: No JVD. Cardiovascular:      Rate and Rhythm: Normal rate and regular rhythm. Heart sounds: No murmur heard. No friction rub. Pulmonary:      Effort: Pulmonary effort is normal.      Breath sounds: Normal breath sounds. No wheezing or rales. Chest:      Chest wall: No tenderness. Abdominal:      General: Bowel sounds are normal.      Palpations: Abdomen is soft. There is no mass. Tenderness: There is no abdominal tenderness. Musculoskeletal:      Cervical back: Normal range of motion and neck supple. Lymphadenopathy:      Cervical: No cervical adenopathy. Skin:     Findings: No rash. Neurological:      Mental Status: He is alert and oriented to person, place, and time. Psychiatric:         Behavior: Behavior is cooperative. DIAGNOSTIC RESULTS     RADIOLOGY:   No orders to display         LABS:  Labs Reviewed   D-DIMER, QUANTITATIVE         EMERGENCY DEPARTMENT COURSE:     Vitals:    Vitals:    03/17/23 1742   BP: (!) 141/69   Pulse: 77   Resp: 18   Temp: 98.2 °F (36.8 °C)   TempSrc: Oral   SpO2: 99%   Weight: 165 lb (74.8 kg)   Height: 5' 9\" (1.753 m)       Medications - No data to display    The pt was seen and evaluated by me. Within the department, I observed the pt's vital signs to be within acceptable range***.  Laboratory and Radiological studies were performed, results were reviewed with the patient. Within the department, the pt was treated with ***. I observed the pt's condition to *** during the duration of their stay. I explained my proposed course of treatment to the pt, and they were amenable to my decision. They were discharged home, and they will return to the ED if their symptoms become more severein nature, or otherwise change. Medical Decision-Making:       FINAL IMPRESSION     No diagnosis found. DISPOSITION/PLAN  PATIENT REFERRED TO:  No follow-up provider specified. DISCHARGE MEDICATIONS:  New Prescriptions    No medications on file         (Please note that portions of this note were completed with a voice recognition program and electronically transcribed. Efforts were [facility] edit the dictations but occasionally words are mis-transcribed . The transcription may contain errors not detected in proofreading.   This transcription was electronically signed.)        Carole Lam MD  Attending Emergency Physician

## 2023-03-17 NOTE — ED NOTES
Pt resting in bed. Respirations even and unlabored. EKG complete. Updated pt on plan of care. Bed lowest position and call light in reach.      Emma Lugo, SAMUEL  03/17/23 0920

## 2023-03-18 LAB
EKG ATRIAL RATE: 69 BPM
EKG P AXIS: 76 DEGREES
EKG P-R INTERVAL: 188 MS
EKG Q-T INTERVAL: 334 MS
EKG QRS DURATION: 78 MS
EKG QTC CALCULATION (BAZETT): 357 MS
EKG R AXIS: 113 DEGREES
EKG T AXIS: -4 DEGREES
EKG VENTRICULAR RATE: 69 BPM

## 2023-03-18 PROCEDURE — 93010 ELECTROCARDIOGRAM REPORT: CPT | Performed by: INTERNAL MEDICINE

## 2023-03-18 NOTE — ED NOTES
Pt resting in bed. Respirations even and unlabored. Updated pt on plan of care. Bed lowest position, call light in reach, side rails x2.      Kiki Jack RN  03/17/23 2025

## 2023-03-18 NOTE — DISCHARGE INSTRUCTIONS
Take aspirin at home for neck pain  Use heating pad on back of neck for pain relief  Follow-up with PCP in 1 week if symptoms worsen  Return to ED if symptoms persist or worsen

## 2023-03-20 ENCOUNTER — TELEPHONE (OUTPATIENT)
Dept: CARDIOLOGY CLINIC | Age: 23
End: 2023-03-20

## 2023-03-20 NOTE — TELEPHONE ENCOUNTER
Pt lm on the nurse line that he had a PVC ABLATION on 2/27/23 and he said his neck was bothering him so he went to the ER on 3/17/23 to get assessed for blood clots. He said he has been sleeping in a recliner because he has back issues and he woke up today and yesterday and his feet are cold but he has no heat in his house. I told him as long as he can feel good pulses in his feet , the symptoms would not be coming from the ablation. He said his groins heeled up fine .  Told pt if he continues to have issues with his feet to see his family md

## 2023-03-29 ENCOUNTER — OFFICE VISIT (OUTPATIENT)
Dept: CARDIOLOGY CLINIC | Age: 23
End: 2023-03-29
Payer: COMMERCIAL

## 2023-03-29 VITALS
WEIGHT: 171.4 LBS | BODY MASS INDEX: 25.39 KG/M2 | SYSTOLIC BLOOD PRESSURE: 132 MMHG | DIASTOLIC BLOOD PRESSURE: 84 MMHG | HEART RATE: 81 BPM | HEIGHT: 69 IN | OXYGEN SATURATION: 98 %

## 2023-03-29 DIAGNOSIS — I49.3 PVC (PREMATURE VENTRICULAR CONTRACTION): Primary | ICD-10-CM

## 2023-03-29 PROCEDURE — 99214 OFFICE O/P EST MOD 30 MIN: CPT | Performed by: INTERNAL MEDICINE

## 2023-03-29 PROCEDURE — 93000 ELECTROCARDIOGRAM COMPLETE: CPT | Performed by: INTERNAL MEDICINE

## 2023-03-30 ENCOUNTER — HOSPITAL ENCOUNTER (OUTPATIENT)
Dept: NON INVASIVE DIAGNOSTICS | Age: 23
Discharge: HOME | End: 2023-03-30
Payer: COMMERCIAL

## 2023-03-30 DIAGNOSIS — I49.3 PVC (PREMATURE VENTRICULAR CONTRACTION): ICD-10-CM

## 2023-03-30 PROCEDURE — 93307 TTE W/O DOPPLER COMPLETE: CPT

## 2023-07-04 ENCOUNTER — HOSPITAL ENCOUNTER (EMERGENCY)
Age: 23
Discharge: HOME | End: 2023-07-04
Attending: STUDENT IN AN ORGANIZED HEALTH CARE EDUCATION/TRAINING PROGRAM
Payer: COMMERCIAL

## 2023-07-04 VITALS
WEIGHT: 165 LBS | BODY MASS INDEX: 24.44 KG/M2 | HEART RATE: 78 BPM | SYSTOLIC BLOOD PRESSURE: 136 MMHG | DIASTOLIC BLOOD PRESSURE: 83 MMHG | TEMPERATURE: 98.4 F | HEIGHT: 69 IN | OXYGEN SATURATION: 99 % | RESPIRATION RATE: 16 BRPM

## 2023-07-04 DIAGNOSIS — M79.604 BILATERAL LEG PAIN: Primary | ICD-10-CM

## 2023-07-04 DIAGNOSIS — R79.89 ELEVATED D-DIMER: ICD-10-CM

## 2023-07-04 DIAGNOSIS — M79.605 BILATERAL LEG PAIN: Primary | ICD-10-CM

## 2023-07-04 LAB
ALBUMIN SERPL BCG-MCNC: 4.6 G/DL (ref 3.5–5.1)
ALP SERPL-CCNC: 49 U/L (ref 38–126)
ALT SERPL W/O P-5'-P-CCNC: 9 U/L (ref 11–66)
ANION GAP SERPL CALC-SCNC: 14 MEQ/L (ref 8–16)
AST SERPL-CCNC: 13 U/L (ref 5–40)
BILIRUB SERPL-MCNC: 0.4 MG/DL (ref 0.3–1.2)
BUN SERPL-MCNC: 11 MG/DL (ref 7–22)
CALCIUM SERPL-MCNC: 9.5 MG/DL (ref 8.5–10.5)
CHLORIDE SERPL-SCNC: 101 MEQ/L (ref 98–111)
CO2 SERPL-SCNC: 26 MEQ/L (ref 23–33)
CREAT SERPL-MCNC: 1.1 MG/DL (ref 0.4–1.2)
D DIMER PPP IA.FEU-MCNC: 551 NG/ML FEU (ref 0–500)
GFR SERPL CREATININE-BSD FRML MDRD: > 60 ML/MIN/1.73M2
GLUCOSE SERPL-MCNC: 86 MG/DL (ref 70–108)
OSMOLALITY SERPL CALC.SUM OF ELEC: 280 MOSMOL/KG (ref 275–300)
POTASSIUM SERPL-SCNC: 4.4 MEQ/L (ref 3.5–5.2)
PROT SERPL-MCNC: 7 G/DL (ref 6.1–8)
SODIUM SERPL-SCNC: 141 MEQ/L (ref 135–145)

## 2023-07-04 PROCEDURE — 80053 COMPREHEN METABOLIC PANEL: CPT

## 2023-07-04 PROCEDURE — 85379 FIBRIN DEGRADATION QUANT: CPT

## 2023-07-04 PROCEDURE — 36415 COLL VENOUS BLD VENIPUNCTURE: CPT

## 2023-07-04 PROCEDURE — 93005 ELECTROCARDIOGRAM TRACING: CPT | Performed by: STUDENT IN AN ORGANIZED HEALTH CARE EDUCATION/TRAINING PROGRAM

## 2023-07-04 PROCEDURE — 99284 EMERGENCY DEPT VISIT MOD MDM: CPT

## 2023-07-04 ASSESSMENT — PAIN SCALES - GENERAL: PAINLEVEL_OUTOF10: 2

## 2023-07-04 ASSESSMENT — PAIN DESCRIPTION - ORIENTATION: ORIENTATION: RIGHT;LEFT

## 2023-07-04 ASSESSMENT — PAIN - FUNCTIONAL ASSESSMENT: PAIN_FUNCTIONAL_ASSESSMENT: 0-10

## 2023-07-04 ASSESSMENT — PAIN DESCRIPTION - LOCATION: LOCATION: LEG

## 2023-07-05 LAB
EKG ATRIAL RATE: 73 BPM
EKG P AXIS: 70 DEGREES
EKG P-R INTERVAL: 174 MS
EKG Q-T INTERVAL: 342 MS
EKG QRS DURATION: 82 MS
EKG QTC CALCULATION (BAZETT): 376 MS
EKG R AXIS: 91 DEGREES
EKG T AXIS: -13 DEGREES
EKG VENTRICULAR RATE: 73 BPM

## 2023-07-05 PROCEDURE — 93010 ELECTROCARDIOGRAM REPORT: CPT | Performed by: INTERNAL MEDICINE

## 2023-07-05 NOTE — ED TRIAGE NOTES
Patient presents to ED with chief complaint of bilateral leg pain. Patient states that pain began about 2 weeks ago and he would like to be checked for blood clots. Denies history of blood clots. Patient resting in bed. Respirations easy and unlabored. No distress noted. Call light within reach.

## 2023-07-05 NOTE — DISCHARGE INSTRUCTIONS
You can contact the radiology department to schedule your bilateral lower leg ultrasound to evaluate for DVT. You have been excluded for PE in the emergency department based on your D-dimer level and being low risk.

## 2023-07-05 NOTE — ED PROVIDER NOTES
[facility] DEPT  EMERGENCY DEPARTMENT ENCOUNTER          Pt Name: Erika Rendon  MRN: [id number]  [address] [facility] 2000  Date of evaluation: 7/4/2023  Physician: Darcy Markham       Chief Complaint   Patient presents with    Leg Pain     Bilateral           HISTORY OF PRESENT ILLNESS    HPI  Erika Rendon is a 20 y.o. male who presents to the emergency department from home, as a walk in to the ED lobby for evaluation of bilateral leg pain. Patient is concerned about a blood clot. Patient reports that he has a prior history of PVCs. Patient is not currently on blood thinners. Patient recently did have a trip to [place] and back. Patient read online that this was likely a blood clot. Patient denies any leg swelling. Patient denies any shortness of breath. The patient has no other acute complaints at this time. REVIEW OF SYSTEMS   Review of Systems   All other systems reviewed and are negative. PAST MEDICAL AND SURGICAL HISTORY     Past Medical History:   Diagnosis Date    PVC (premature ventricular contraction)      Past Surgical History:   Procedure Laterality Date    ABLATION OF DYSRHYTHMIC FOCUS           MEDICATIONS   No current facility-administered medications for this encounter.     Current Outpatient Medications:     metoprolol tartrate (LOPRESSOR) 25 MG tablet, Take 2 tablets by mouth 2 times daily, Disp: 360 tablet, Rfl: 1    Discharge Medication List as of 7/4/2023 11:29 PM        CONTINUE these medications which have NOT CHANGED    Details   metoprolol tartrate (LOPRESSOR) 25 MG tablet Take 2 tablets by mouth 2 times daily, Disp-360 tablet, R-1Normal               SOCIAL HISTORY     Social History     Social History Narrative    Not on file     Social History     Tobacco Use    Smoking status: Never     Passive exposure: Never    Smokeless tobacco: Never   Vaping Use    Vaping Use: Never used   Substance Use Topics    Alcohol

## 2023-07-07 ENCOUNTER — HOSPITAL ENCOUNTER (OUTPATIENT)
Dept: INTERVENTIONAL RADIOLOGY/VASCULAR | Age: 23
Discharge: HOME | End: 2023-07-07
Attending: STUDENT IN AN ORGANIZED HEALTH CARE EDUCATION/TRAINING PROGRAM
Payer: COMMERCIAL

## 2023-07-07 DIAGNOSIS — M79.604 BILATERAL LEG PAIN: ICD-10-CM

## 2023-07-07 DIAGNOSIS — M79.605 BILATERAL LEG PAIN: ICD-10-CM

## 2023-07-07 DIAGNOSIS — R79.89 ELEVATED D-DIMER: ICD-10-CM

## 2023-07-07 PROCEDURE — 93970 EXTREMITY STUDY: CPT

## 2023-10-12 ENCOUNTER — OFFICE VISIT (OUTPATIENT)
Dept: CARDIOLOGY CLINIC | Age: 23
End: 2023-10-12
Payer: COMMERCIAL

## 2023-10-12 VITALS
WEIGHT: 163.8 LBS | BODY MASS INDEX: 24.26 KG/M2 | HEART RATE: 88 BPM | SYSTOLIC BLOOD PRESSURE: 150 MMHG | DIASTOLIC BLOOD PRESSURE: 82 MMHG | HEIGHT: 69 IN

## 2023-10-12 DIAGNOSIS — I49.3 PVC (PREMATURE VENTRICULAR CONTRACTION): Primary | ICD-10-CM

## 2023-10-12 PROCEDURE — 99214 OFFICE O/P EST MOD 30 MIN: CPT | Performed by: INTERNAL MEDICINE

## 2023-10-12 RX ORDER — VERAPAMIL HYDROCHLORIDE 40 MG/1
60 TABLET ORAL 2 TIMES DAILY
COMMUNITY

## 2023-10-12 NOTE — PROGRESS NOTES
[address] [address]  Dept: [phone]  Dept Fax: [phone]  Loc: [phone]    Visit Date: 10/12/2023    Mr. La Jarrett is a 20 y.o. male  who presented for:    HPI:   21 yo M is referred here from irregular heart beats by neurology. Patient was in the ER for what seems to be paresthesia. He is very nervous about possibility of CVA. Concerned about high BP. He underwent Echo and holter monitor. Holter showed 23% PVC. On beta blockers    He underwent PVC ablation with Dr. Abdon Corral and then  he had two ablations at [facility]. He states he is feeling better. Still occasionally feels symptoms. On Verapamil. He has a zio patch after this. He is to have a virtual visit with [facility] tomorrow. Current Outpatient Medications:     verapamil (CALAN) 40 MG tablet, Take 1.5 tablets by mouth in the morning and at bedtime, Disp: , Rfl:     Past Medical History  Nain Rosen  has a past medical history of PVC (premature ventricular contraction). Social History  Nain Rosen  reports that he has never smoked. He has never been exposed to tobacco smoke. He has never used smokeless tobacco. He reports that he does not currently use alcohol. He reports that he does not use drugs. Family History  Nain Rosen family history includes Diabetes in his father; No Known Problems in his brother, brother, brother, mother, and sister. Past Surgical History   Past Surgical History:   Procedure Laterality Date    ABLATION OF DYSRHYTHMIC FOCUS         Subjective:     REVIEW OF SYSTEMS  Constitutional: denies sweats, chills and fever  HENT: denies  congestion, sinus pressure, sneezing and sore throat. Eyes: denies  pain, discharge, redness and itching. Respiratory: denies apnea, cough  Gastrointestinal: denies blood in stool, constipation, diarrhea   Endocrine: denies cold intolerance, heat intolerance, polydipsia.   Genitourinary:

## 2023-10-12 NOTE — PROGRESS NOTES
1 year follow-up. He had an ablation in July and August at [facility]. He states he has a virtual appt with them tomorrow and just was finished wearing a zio patch.

## 2023-10-19 ENCOUNTER — HOSPITAL ENCOUNTER (EMERGENCY)
Age: 23
Discharge: HOME | End: 2023-10-19
Payer: COMMERCIAL

## 2023-10-19 ENCOUNTER — TELEPHONE (OUTPATIENT)
Dept: NEUROLOGY | Age: 23
End: 2023-10-19

## 2023-10-19 VITALS
HEART RATE: 90 BPM | RESPIRATION RATE: 16 BRPM | TEMPERATURE: 98 F | SYSTOLIC BLOOD PRESSURE: 140 MMHG | OXYGEN SATURATION: 96 % | BODY MASS INDEX: 23.63 KG/M2 | DIASTOLIC BLOOD PRESSURE: 76 MMHG | WEIGHT: 160 LBS

## 2023-10-19 DIAGNOSIS — M62.838 NECK MUSCLE SPASM: ICD-10-CM

## 2023-10-19 DIAGNOSIS — M54.2 NECK PAIN: Primary | ICD-10-CM

## 2023-10-19 PROCEDURE — 99283 EMERGENCY DEPT VISIT LOW MDM: CPT

## 2023-10-19 RX ORDER — METHOCARBAMOL 750 MG/1
750 TABLET, FILM COATED ORAL 4 TIMES DAILY
Qty: 40 TABLET | Refills: 0 | Status: SHIPPED | OUTPATIENT
Start: 2023-10-19 | End: 2023-10-29

## 2023-10-19 NOTE — ED TRIAGE NOTES
Pt to ED with left sided neck pain off and on for a year. Pt states that if he is looking over his shoulder a lot the left side of his neck will get sore. Pt states he laid on the left side last night and it felt like his neck locked up.

## 2023-10-19 NOTE — ED PROVIDER NOTES
[facility] EMERGENCY DEPT  EMERGENCY DEPARTMENT ENCOUNTER      Pt Name: Sierra Conte  MRN: [id number]  [address] [facility] 2000  Date of evaluation: 10/19/2023  Provider: Ladarius Sevilla PA-C    CHIEF COMPLAINT     Chief Complaint   Patient presents with    Neck Pain       HISTORY OF PRESENT ILLNESS    Sierra Conte is a 20 y.o. male who presents to the emergency department complaints of left-sided neck pain. Patient states been have problems with his neck for several months. To even see neurology regarding this problem. States that this present for several days. So on the left side the neck. Describes it as feeling pain as well as some pinching. It is worse when he turns his head to the left. Patient denies any fevers or chills. Denies any pain in the posterior neck. Denies any injury or trauma to the neck. Denies any weakness or paresthesias of the upper extremities. Triage notes and Nursing notes were reviewed by myself. Any discrepancies are addressed above. PAST MEDICAL HISTORY     Past Medical History:   Diagnosis Date    PVC (premature ventricular contraction)        SURGICAL HISTORY       Past Surgical History:   Procedure Laterality Date    ABLATION OF DYSRHYTHMIC FOCUS         CURRENT MEDICATIONS       Previous Medications    VERAPAMIL (CALAN) 40 MG TABLET    Take 1.5 tablets by mouth in the morning and at bedtime       ALLERGIES     Patient has no known allergies.     FAMILY HISTORY       Family History   Problem Relation Age of Onset    No Known Problems Mother     Diabetes Father     No Known Problems Sister     No Known Problems Brother     No Known Problems Brother     No Known Problems Brother         SOCIAL HISTORY     Social History     Socioeconomic History    Marital status: Single   Tobacco Use    Smoking status: Never     Passive exposure: Never    Smokeless tobacco: Never   Vaping Use    Vaping Use: Never used   Substance and Sexual Activity    Alcohol use: Not Currently

## 2023-10-19 NOTE — TELEPHONE ENCOUNTER
Patient called stating he woke up one morning with head leaning to one side and sharp pain in neck. When he turns his head to the right or left to quickly he will get red flash in eyes that lasts 1 second and pain behind eye. This is not constant but will occur every couple of weeks. When lying on his left side in bed the other night his body stiffened up. He felt like his whole body was paralyzed. This lasted about 1 minute. Last visit 1/4/23, follow up as needed. Please advise. Thank you.

## 2023-11-27 ENCOUNTER — TELEPHONE (OUTPATIENT)
Dept: NEUROLOGY | Age: 23
End: 2023-11-27

## 2023-11-27 NOTE — TELEPHONE ENCOUNTER
----- Message from Yue Jeffers RN sent at 11/22/2023 12:37 PM EST -----  Regarding: send to p  Patient called stating he was evaluated by [facility] ED as previously instructed. Patient was treated and released. Per patient ED felt he may have pulled a muscle and gave him muscle relaxors and anti inflammatories. Per patient, ED instructed to take for 2 weeks and if not better to follow up with neurology. Patient continues to have symptoms of when lying on left side neck, his body locks up and when turning head too quickly he sees flash in his eye. Last visit 1/4/23, follow up as needed. Patient requesting follow up appointment. Please advise. Thank you.        . [phone]

## 2024-01-17 ENCOUNTER — OFFICE VISIT (OUTPATIENT)
Dept: NEUROLOGY | Age: 24
End: 2024-01-17
Payer: COMMERCIAL

## 2024-01-17 VITALS
WEIGHT: 175 LBS | DIASTOLIC BLOOD PRESSURE: 79 MMHG | BODY MASS INDEX: 25.92 KG/M2 | HEIGHT: 69 IN | HEART RATE: 84 BPM | SYSTOLIC BLOOD PRESSURE: 129 MMHG | OXYGEN SATURATION: 99 %

## 2024-01-17 DIAGNOSIS — M54.2 NECK PAIN: ICD-10-CM

## 2024-01-17 DIAGNOSIS — Z82.49 FAMILY HISTORY OF CEREBRAL ANEURYSM: ICD-10-CM

## 2024-01-17 DIAGNOSIS — H53.9 VISUAL DISTURBANCE: Primary | ICD-10-CM

## 2024-01-17 PROCEDURE — 99214 OFFICE O/P EST MOD 30 MIN: CPT | Performed by: NURSE PRACTITIONER

## 2024-01-17 NOTE — PROGRESS NOTES
NEUROLOGY OUT PATIENT FOLLOW UP NOTE:  1/17/202412:54 PM    Hank George is here for follow up for intermittent facial numbness.            No Known Allergies    Current Outpatient Medications:     verapamil (CALAN) 40 MG tablet, Take 1.5 tablets by mouth in the morning and at bedtime, Disp: , Rfl:     I reviewed the past medical history, allergies, medications, social history and family history.       PE:   Vitals:    01/17/24 1244   BP: 129/79   Site: Left Upper Arm   Position: Sitting   Cuff Size: Medium Adult   Pulse: 84   SpO2: 99%   Weight: 79.4 kg (175 lb)   Height: 1.753 m (5' 9\")        General Appearance:  awake, alert, oriented, in no acute distress  Gen: NAD, Language is Intact, speech is pressured. Skin: no rash, lesion, dry  to touch. warm  Head: no rash, no icterus  Neck: There is no carotid bruits. The Neck is supple.   Neuro: CN 2-12 grossly intact with no focal deficits. Power 5/5 Throughout symmetric, Reflexes are +2 symmetric. Long tracts are intact. Cerebellar exam is Intact. Sensory exam is intact to light touch.  Gait is intact.  Musculoskeletal:  Has no hand arthritis, no limitation of ROM in any of the four extremities.  Lower extremities no edema        DATA:       Results for orders placed or performed during the hospital encounter of 07/04/23   D-Dimer, Quantitative   Result Value Ref Range    D-Dimer, Quant 551.00 (H) 0.00 - 500.00 ng/ml FEU   CMP   Result Value Ref Range    Glucose 86 70 - 108 mg/dL    Creatinine 1.1 0.4 - 1.2 mg/dL    BUN 11 7 - 22 mg/dL    Sodium 141 135 - 145 meq/L    Potassium 4.4 3.5 - 5.2 meq/L    Chloride 101 98 - 111 meq/L    CO2 26 23 - 33 meq/L    Calcium 9.5 8.5 - 10.5 mg/dL    AST 13 5 - 40 U/L    Alkaline Phosphatase 49 38 - 126 U/L    Total Protein 7.0 6.1 - 8.0 g/dL    Albumin 4.6 3.5 - 5.1 g/dL    Total Bilirubin 0.4 0.3 - 1.2 mg/dL    ALT 9 (L) 11 - 66 U/L   Anion Gap   Result Value Ref Range    Anion Gap 14.0 8.0 - 16.0 meq/L   Glomerular

## 2024-01-17 NOTE — PATIENT INSTRUCTIONS
MRI cervical spine WO contrast  CTA head and neck W/WO contrast   Follow up in 4-6 weeks or sooner if needed.  Call if any questions or concerns.

## 2024-02-08 ENCOUNTER — HOSPITAL ENCOUNTER (OUTPATIENT)
Dept: MRI IMAGING | Age: 24
Discharge: HOME | End: 2024-02-08
Payer: COMMERCIAL

## 2024-02-08 ENCOUNTER — TELEPHONE (OUTPATIENT)
Dept: NEUROLOGY | Age: 24
End: 2024-02-08

## 2024-02-08 ENCOUNTER — HOSPITAL ENCOUNTER (OUTPATIENT)
Dept: CT IMAGING | Age: 24
Discharge: HOME | End: 2024-02-08
Payer: COMMERCIAL

## 2024-02-08 DIAGNOSIS — M54.2 NECK PAIN: ICD-10-CM

## 2024-02-08 DIAGNOSIS — Z82.49 FAMILY HISTORY OF CEREBRAL ANEURYSM: ICD-10-CM

## 2024-02-08 DIAGNOSIS — H53.9 VISUAL DISTURBANCE: ICD-10-CM

## 2024-02-08 PROCEDURE — 72141 MRI NECK SPINE W/O DYE: CPT

## 2024-02-08 PROCEDURE — 70498 CT ANGIOGRAPHY NECK: CPT

## 2024-02-08 PROCEDURE — 70496 CT ANGIOGRAPHY HEAD: CPT

## 2024-02-08 PROCEDURE — 6360000004 HC RX CONTRAST MEDICATION: Performed by: NURSE PRACTITIONER

## 2024-02-08 RX ADMIN — IOPAMIDOL 80 ML: 755 INJECTION, SOLUTION INTRAVENOUS at 07:00

## 2024-02-08 NOTE — TELEPHONE ENCOUNTER
Spoke to the patient regarding results. Verbalized understanding. Interventional neurology will contact the patient to schedule. No questions from the patient at this time.

## 2024-02-08 NOTE — TELEPHONE ENCOUNTER
See result note from today (2/8/24). CTA head W/WO contrast showed Possible 2 mm outpouching at the level of the anterior communicating artery. This could represent a tiny aneurysm    Recommend evaluation with interventional neurology group regarding this   Paige Leopold, CNP

## 2024-02-08 NOTE — TELEPHONE ENCOUNTER
----- Message from Hank George sent at 2/8/2024 10:46 AM EST -----  Regarding: CT scan results  Contact: [phone]  First of all I should say thank you for ordering me the CT scans that found the possible aneurysm in my brain. Secondly I would like to ask if you have any words to possibly calm me down a bit before I see you in office on the 23rd. I know that the report said it was only a possible tiny aneurysm and only showed up on one photo but I don’t know if I’ll be able to keep myself calm knowing that information. Thank you for your time

## 2024-02-08 NOTE — TELEPHONE ENCOUNTER
----- Message from Paige L Leopold, APRN - CNP sent at 2/8/2024  8:49 AM EST -----  Please let patient know his CTA head W/WO contrast showed Possible 2 mm outpouching at the level of the anterior communicating artery. This could represent a tiny aneurysm.   Recommend evaluation with interventional neurology group regarding this  Will place order  Paige Leopold, CNP

## 2024-02-23 ENCOUNTER — OFFICE VISIT (OUTPATIENT)
Dept: NEUROLOGY | Age: 24
End: 2024-02-23
Payer: COMMERCIAL

## 2024-02-23 VITALS
HEART RATE: 97 BPM | SYSTOLIC BLOOD PRESSURE: 130 MMHG | HEIGHT: 69 IN | OXYGEN SATURATION: 99 % | WEIGHT: 166 LBS | DIASTOLIC BLOOD PRESSURE: 79 MMHG | BODY MASS INDEX: 24.59 KG/M2

## 2024-02-23 DIAGNOSIS — R20.0 NUMBNESS: ICD-10-CM

## 2024-02-23 DIAGNOSIS — H53.9 VISUAL DISTURBANCE: ICD-10-CM

## 2024-02-23 DIAGNOSIS — R20.0 NUMBNESS OF FACE: ICD-10-CM

## 2024-02-23 DIAGNOSIS — Z82.49 FAMILY HISTORY OF CEREBRAL ANEURYSM: ICD-10-CM

## 2024-02-23 DIAGNOSIS — M54.2 NECK PAIN: Primary | ICD-10-CM

## 2024-02-23 DIAGNOSIS — R93.0 ABNORMAL COMPUTED TOMOGRAPHY ANGIOGRAPHY OF HEAD: ICD-10-CM

## 2024-02-23 PROCEDURE — 99214 OFFICE O/P EST MOD 30 MIN: CPT | Performed by: NURSE PRACTITIONER

## 2024-02-23 NOTE — PATIENT INSTRUCTIONS
Proceed with neuro-interventional neurology re: Possible 2 mm outpouching at the level of the anterior communicating artery. This could represent a tiny aneurysm.  Follow up as needed.  Call if any questions or concerns.

## 2024-02-23 NOTE — PROGRESS NOTES
the level the anterior communicating artery, there is a 2 mm blind-ending focus. This is only seen on the coronal 1 mm images. This cannot be confirmed  on the axial or sagittal images. This is not seen on the MIP images. This is marked with a narrow. This is seen on image 94 of series 15.    Vertebral arteries: The vertebral arteries are normal.    Basilar artery: Normal.    Superior cerebellar arteries: Normal.    Posterior cerebral arteries: The P1 segments are normal and symmetric.  The P2 branches are normal.        No aneurysms, stenoses or occlusions are noted.    The superior sagittal sinus, vein of Emmanuel, internal cerebral veins, straight sinus, transverse sinuses and sigmoid sinuses are patent.      Axial source data: The brain appears normal. There is no cervical adenopathy. There are no suspicious findings the lung apices.    Impression  1. Normal CTA of the neck.  2. Possible 2 mm outpouching at the level of the anterior communicating artery. This could represent a tiny aneurysm. This is only seen on one image. A follow-up examination in one year is recommended.          **This report has been created using voice recognition software. It may contain minor errors which are inherent in voice recognition technology.**    Final report electronically signed by Dr. [place] Cain on 2/8/2024 8:37 AM    No results found for this or any previous visit.    Results for orders placed during the hospital encounter of 10/18/22    MRI BRAIN W WO CONTRAST    Narrative  PROCEDURE: MRI BRAIN W WO CONTRAST    CLINICAL INFORMATIONNumbness of face, Numbness. Left facial numbness intermittently for one month. Assess for demyelination.    COMPARISON: No prior study.    TECHNIQUE: Multiplanar and multiple spin echo T1 and T2-weighted images were obtained through the brain before and after the administration of intravenous contrast. High resolution sagittal FLAIR images were also obtained.    FINDINGS:        The

## 2024-03-12 ENCOUNTER — OFFICE VISIT (OUTPATIENT)
Dept: NEUROLOGY | Age: 24
End: 2024-03-12
Payer: COMMERCIAL

## 2024-03-12 VITALS
SYSTOLIC BLOOD PRESSURE: 124 MMHG | DIASTOLIC BLOOD PRESSURE: 70 MMHG | WEIGHT: 166 LBS | BODY MASS INDEX: 24.59 KG/M2 | HEIGHT: 69 IN

## 2024-03-12 DIAGNOSIS — R93.0 ABNORMAL COMPUTED TOMOGRAPHY ANGIOGRAPHY OF HEAD: ICD-10-CM

## 2024-03-12 DIAGNOSIS — R93.0 ABNORMAL COMPUTED TOMOGRAPHY ANGIOGRAPHY OF HEAD: Primary | ICD-10-CM

## 2024-03-12 DIAGNOSIS — Z82.49 FAMILY HISTORY OF CEREBRAL ANEURYSM: Primary | ICD-10-CM

## 2024-03-12 DIAGNOSIS — Z82.49 FAMILY HISTORY OF CEREBRAL ANEURYSM: ICD-10-CM

## 2024-03-12 PROCEDURE — 99205 OFFICE O/P NEW HI 60 MIN: CPT

## 2024-03-12 NOTE — PROGRESS NOTES
Outpatient Neurointerventional Consult Note    Date:3/12/2024       Patient Name:Hank George     YOB: 2000     Age:23 y.o.    Requesting Physician: Paige Leopold, APRN-CNP    Reason for Consult:  Evaluate for possible 2 mm outpouching at the level of the ACOMM artery      Chief Complaint: possible ACOMM aneurysm    Subjective     Hank George is a 23 y.o. male who presents to the interventional neurology clinic for evaluation and management of possible 2 mm outpouching at the level of the ACOMM artery, referred by Dr. Amador/Paige Leopold, APRN-CNP. Patient follows with Dr. Amador's office for intermittent facial numbness, neck pain, and intermittent visual disturbances. Past medical history significant for PVCs s/p cardiac ablation with Dr. Aguilera and cardiac ablation x 2 at [facility], anxiety, HTN. He continues to follow with [facility] EP virtually, as needed. Outpatient medications include Verapamil 120 mg BID. CTA head and neck obtained 2/8/24 due to the neurologic symptoms he was previously experiencing, as well as family history of cerebral aneurysm (maternal grandfather), and revealed a possible 2 mm outpouching at the level of the anterior communicating artery, which could represent a tiny aneurysm, although it is only seen on one image. Today, patient denies headaches, vision changes, facial numbness, facial droop, difficulty speaking or understanding, chest pain, trouble breathing, extremity weakness, or difficulty ambulating. He states he is very active and his cardiac symptoms have improved following cardiac ablations and initiation of verapamil. He states his visual disturbances and facial numbness/tingling have also significantly improved. He does admit to significant anxiety regarding his CTA findings. He states he is supposed to fly to [place] for an upcoming family vacation and really doesn't want to miss it, but also doesn't want to harm himself by doing so. He denies recent

## 2024-03-12 NOTE — PROGRESS NOTES
Neurology Office Note    Date:3/12/2024        Patient Name:Hank George     YOB: 2000     Age:23 y.o.    Requesting Physician: Paige Leopold    Reason for Visit: Possible aneurysm      Chief Complaint: No chief complaint on file.       Subjective       ***    Review of Systems   Review of Systems    Medications   Home Meds:   Current Outpatient Medications on File Prior to Visit   Medication Sig Dispense Refill    verapamil (CALAN) 40 MG tablet Take 1.5 tablets by mouth in the morning and at bedtime       No current facility-administered medications on file prior to visit.      PRN Meds:     Past History    Past Medical History:   has a past medical history of PVC (premature ventricular contraction).    Social History:   reports that he has never smoked. He has never been exposed to tobacco smoke. He has never used smokeless tobacco. He reports that he does not currently use alcohol. He reports that he does not use drugs.     Family History:   Family History   Problem Relation Age of Onset    No Known Problems Mother     Diabetes Father     No Known Problems Sister     No Known Problems Brother     No Known Problems Brother     No Known Problems Brother        Physical Examination      Vitals:  There were no vitals taken for this visit.  @TMAX(24)@      Physical Exam  Neurologic Exam     Labs/Imaging/Diagnostics   Labs: ***      Imaging:  No results found.     Assessment and Plan:          ***    Electronically signed by ARY Fields on 3/12/24 at 7:57 AM EDT

## 2024-03-18 ASSESSMENT — ENCOUNTER SYMPTOMS
COUGH: 0
SHORTNESS OF BREATH: 0
TROUBLE SWALLOWING: 0
ABDOMINAL PAIN: 0
RHINORRHEA: 0
PHOTOPHOBIA: 0
SORE THROAT: 0
VOMITING: 0

## 2025-03-10 ENCOUNTER — HOSPITAL ENCOUNTER (OUTPATIENT)
Dept: CT IMAGING | Age: 25
Discharge: HOME | End: 2025-03-10
Payer: COMMERCIAL

## 2025-03-10 DIAGNOSIS — Z82.49 FAMILY HISTORY OF CEREBRAL ANEURYSM: ICD-10-CM

## 2025-03-10 DIAGNOSIS — R93.0 ABNORMAL COMPUTED TOMOGRAPHY ANGIOGRAPHY OF HEAD: ICD-10-CM

## 2025-03-10 PROCEDURE — 6360000004 HC RX CONTRAST MEDICATION

## 2025-03-10 PROCEDURE — 70496 CT ANGIOGRAPHY HEAD: CPT

## 2025-03-10 RX ORDER — IOPAMIDOL 755 MG/ML
80 INJECTION, SOLUTION INTRAVASCULAR
Status: COMPLETED | OUTPATIENT
Start: 2025-03-10 | End: 2025-03-10

## 2025-03-10 RX ADMIN — IOPAMIDOL 80 ML: 755 INJECTION, SOLUTION INTRAVENOUS at 08:33

## 2025-03-19 ENCOUNTER — OFFICE VISIT (OUTPATIENT)
Dept: NEUROLOGY | Age: 25
End: 2025-03-19
Payer: COMMERCIAL

## 2025-03-19 VITALS
DIASTOLIC BLOOD PRESSURE: 72 MMHG | SYSTOLIC BLOOD PRESSURE: 124 MMHG | HEIGHT: 69 IN | HEART RATE: 85 BPM | BODY MASS INDEX: 25.18 KG/M2 | WEIGHT: 170 LBS

## 2025-03-19 DIAGNOSIS — Z82.49 FAMILY HISTORY OF CEREBRAL ANEURYSM: Primary | ICD-10-CM

## 2025-03-19 DIAGNOSIS — R93.0 ABNORMAL COMPUTED TOMOGRAPHY ANGIOGRAPHY OF HEAD: ICD-10-CM

## 2025-03-19 PROCEDURE — 99214 OFFICE O/P EST MOD 30 MIN: CPT

## 2025-03-19 ASSESSMENT — ENCOUNTER SYMPTOMS
VOMITING: 0
ABDOMINAL PAIN: 0
NAUSEA: 0
RHINORRHEA: 0
SORE THROAT: 0
COUGH: 0
SHORTNESS OF BREATH: 0

## 2025-03-19 ASSESSMENT — VISUAL ACUITY: VA_NORMAL: 1

## 2025-03-19 NOTE — PROGRESS NOTES
ensure it is less than 130/80.  Discussed with patient to discuss with primary care provider if any work-up for secondary hypertension was done given his young age.  Continue smoking cessation.  Emergently present to the ED if experiencing any new neurologic symptoms or the worst headache of his life. Patient expressed verbal understanding.  We will see the patient in the interventional neurology clinic in 2 years with repeat CTA head prior to visit. Our office will call patient to schedule appointment.  Please call the office with any further questions or concerns.    This patient was seen and evaluated with Dr. Núñez and he is in agreement with the assessment and plan.    Electronically signed by ARY Fields on 3/20/25 at 11:27 AM EDT     I had a face-to-face encounter with this patient today where I evaluated the patient with Amalia Sheikh PA-C. The history and physical examination was completed by me as documented in the attached note. All relevant radiology images, labs and vitals signs were reviewed by me. I agree with the physical examination, assessment, and plan as documented which was formulated by me. The timing of this note filing does not necessarily correlate with the timing of when the patient was seen by me.      Marko Núñez MD  Vascular and Interventional Neurology, Neurocritical Care  [facility]

## 2025-04-04 ENCOUNTER — HOSPITAL ENCOUNTER (OUTPATIENT)
Age: 25
Discharge: HOME | End: 2025-04-04
Payer: COMMERCIAL

## 2025-04-04 LAB
DEPRECATED RDW RBC AUTO: 38.3 FL (ref 35–45)
ERYTHROCYTE [DISTWIDTH] IN BLOOD BY AUTOMATED COUNT: 12 % (ref 11.5–14.5)
FERRITIN SERPL IA-MCNC: 138 NG/ML (ref 30–400)
HCT VFR BLD AUTO: 43.4 % (ref 42–52)
HGB BLD-MCNC: 15.3 GM/DL (ref 14–18)
MCH RBC QN AUTO: 30.7 PG (ref 26–33)
MCHC RBC AUTO-ENTMCNC: 35.3 GM/DL (ref 32.2–35.5)
MCV RBC AUTO: 87.1 FL (ref 80–94)
PLATELET # BLD AUTO: 215 THOU/MM3 (ref 130–400)
PMV BLD AUTO: 10.7 FL (ref 9.4–12.4)
RBC # BLD AUTO: 4.98 MILL/MM3 (ref 4.7–6.1)
T4 FREE SERPL-MCNC: 1.4 NG/DL (ref 0.92–1.68)
TSH SERPL DL<=0.05 MIU/L-ACNC: 3.76 UIU/ML (ref 0.27–4.2)
WBC # BLD AUTO: 6.5 THOU/MM3 (ref 4.8–10.8)

## 2025-04-04 PROCEDURE — 36415 COLL VENOUS BLD VENIPUNCTURE: CPT

## 2025-04-04 PROCEDURE — 86038 ANTINUCLEAR ANTIBODIES: CPT

## 2025-04-04 PROCEDURE — 85027 COMPLETE CBC AUTOMATED: CPT

## 2025-04-04 PROCEDURE — 82728 ASSAY OF FERRITIN: CPT

## 2025-04-04 PROCEDURE — 84443 ASSAY THYROID STIM HORMONE: CPT

## 2025-04-04 PROCEDURE — 84439 ASSAY OF FREE THYROXINE: CPT

## 2025-04-06 LAB — NUCLEAR IGG SER QL IA: NORMAL
